# Patient Record
Sex: MALE | Race: WHITE | NOT HISPANIC OR LATINO | URBAN - METROPOLITAN AREA
[De-identification: names, ages, dates, MRNs, and addresses within clinical notes are randomized per-mention and may not be internally consistent; named-entity substitution may affect disease eponyms.]

---

## 2017-03-03 ENCOUNTER — OFFICE VISIT (OUTPATIENT)
Dept: INTERNAL MEDICINE | Facility: CLINIC | Age: 22
End: 2017-03-03

## 2017-03-03 VITALS
OXYGEN SATURATION: 100 % | HEIGHT: 76 IN | TEMPERATURE: 98 F | DIASTOLIC BLOOD PRESSURE: 70 MMHG | BODY MASS INDEX: 25.82 KG/M2 | HEART RATE: 59 BPM | SYSTOLIC BLOOD PRESSURE: 130 MMHG | WEIGHT: 212 LBS | RESPIRATION RATE: 12 BRPM

## 2017-03-03 DIAGNOSIS — L70.0 ACNE VULGARIS: ICD-10-CM

## 2017-03-03 DIAGNOSIS — Z78.9 VEGAN DIET: Primary | ICD-10-CM

## 2017-03-03 DIAGNOSIS — Z00.00 HEALTHCARE MAINTENANCE: ICD-10-CM

## 2017-03-03 PROCEDURE — 93000 ELECTROCARDIOGRAM COMPLETE: CPT | Performed by: INTERNAL MEDICINE

## 2017-03-03 PROCEDURE — 99385 PREV VISIT NEW AGE 18-39: CPT | Performed by: INTERNAL MEDICINE

## 2017-03-03 RX ORDER — LANOLIN ALCOHOL/MO/W.PET/CERES
CREAM (GRAM) TOPICAL
Qty: 60 TABLET | Refills: 5
Start: 2017-03-03

## 2017-03-03 RX ORDER — ERGOCALCIFEROL (VITAMIN D2) 10 MCG
400 TABLET ORAL DAILY
COMMUNITY
End: 2019-03-14

## 2017-03-03 NOTE — PROGRESS NOTES
"Annual Exam (new pt cpe)      HPI  Homer Tamez is a 21 y.o. male presents to establish/ CPE:  Transfer from Dr. Barrios in Peds.   Has been healthy overall.   Had surgery for varicocoele in 2009 due to pain and swelling. Sx resolved.   Had pilonidal cyst removed in 2014.  No recurrence.  Pt is vegan and takes Vitamin D and B12 daily.  Is active in the gym.       Review of Systems   Constitution: Negative for chills, fever, malaise/fatigue, weight gain and weight loss.   HENT: Negative for congestion, headaches, hearing loss, hoarse voice, odynophagia and sore throat.    Eyes: Negative for discharge, double vision, pain and redness.        No recent exam; no contacts or glasses   Cardiovascular: Negative for chest pain, dyspnea on exertion, irregular heartbeat, near-syncope, palpitations and syncope.   Respiratory: Negative for cough, shortness of breath and wheezing.    Skin: Negative for rash and suspicious lesions.   Musculoskeletal: Negative for joint pain, joint swelling, muscle cramps, muscle weakness and myalgias.   Gastrointestinal: Negative for bloating, abdominal pain, constipation, diarrhea, dysphagia, heartburn, nausea and vomiting.   Genitourinary: Negative for dysuria, frequency, hematuria, nocturia and urgency.   Neurological: Negative for dizziness and light-headedness.   Psychiatric/Behavioral: Negative for depression. The patient is nervous/anxious (\"recently, just with school\".  Not interfering with success or relationships. Does not feel like needs help with it. ). The patient does not have insomnia.        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[employer]; exercise science     Social History Main Topics   • Smoking status: Never Smoker   • Smokeless tobacco: Not on file   • Alcohol use No   • Drug use: Yes     Special: Marijuana      Comment: 1-2x/ week; smoke   • Sexual activity: Yes     Partners: Female     Birth control/ protection: Condom      Comment: one partner; no hx STD's     Other Topics Concern   • Not on file     Social History Narrative    Diet - Vegan since 2016     Exercise - 6x/ week weights >> cardio       Family History:   Family History   Problem Relation Age of Onset   • Asthma Mother    • Hyperlipidemia Father    • Hypertension Father    • Depression Father    • Anxiety disorder Father    • Sleep apnea Father    • Hernia Father    • Depression Brother    • Anxiety disorder Brother    • Hyperlipidemia Maternal Grandmother    • Hypertension Maternal Grandmother    • Stroke Maternal Grandmother    • Heart attack Maternal Grandmother    • Breast cancer Maternal Grandmother    • Asthma Maternal Grandmother    • Heart attack Maternal Grandfather      51   • Other Paternal Grandmother      brain tumor   • Hypertension Paternal Grandmother    • Sleep apnea Paternal Grandmother    • Drug abuse Paternal Grandmother    • Osteoporosis Paternal Grandmother    • Alcohol abuse Paternal Grandfather    • Hypertension Paternal Grandfather    • Liver cancer Paternal Grandfather    • Glaucoma Paternal Grandfather    • Heart attack Paternal Grandfather    • Other Paternal Grandfather      endarterectomy   • Leukemia Paternal Grandfather        Surgical History:   Past Surgical History   Procedure Laterality Date   • Varicocele excision Left 2009     due to pain and swelling, sx resolved post-op   • Pilonidal cystectomy  2014     no recurrence.          Current Outpatient Prescriptions:   •  Protein powder, , Disp: , Rfl:   •  Vitamin D, Cholecalciferol, (CHOLECALCIFEROL) 400 UNITS tablet,  Take 400 Units by mouth Daily., Disp: , Rfl:   •  vitamin B-12 (CYANOCOBALAMIN) 1000 MCG tablet, Take 2 PO daily, Disp: 60 tablet, Rfl: 5    Vitals:    03/03/17 1023   BP: 130/70   Pulse: 59   Resp: 12   Temp: 98 °F (36.7 °C)   SpO2: 100%     B/P recheck: 130/80    Physical Exam   Constitutional: He is oriented to person, place, and time. He appears well-developed and well-nourished. He is cooperative. No distress.   HENT:   Head: Normocephalic and atraumatic.   Right Ear: Hearing and external ear normal.   Left Ear: Hearing, tympanic membrane, external ear and ear canal normal.   Nose: Nose normal.   Mouth/Throat: Uvula is midline, oropharynx is clear and moist and mucous membranes are normal.   Partially impacted and obstructing cerumen in R canal, obscures TM.    Eyes: Conjunctivae, EOM and lids are normal. Pupils are equal, round, and reactive to light.   Neck: Normal range of motion and full passive range of motion without pain. Neck supple. Carotid bruit is not present. No thyroid mass and no thyromegaly present.   Cardiovascular: Normal rate, regular rhythm, S1 normal, S2 normal, normal heart sounds and intact distal pulses.  Exam reveals no gallop and no friction rub.    No murmur heard.  Pulses:       Radial pulses are 2+ on the right side, and 2+ on the left side.        Dorsalis pedis pulses are 2+ on the right side, and 2+ on the left side.        Posterior tibial pulses are 2+ on the right side, and 2+ on the left side.   Pulmonary/Chest: Effort normal and breath sounds normal. No respiratory distress. He has no wheezes. He has no rhonchi. He has no rales.   Abdominal: Soft. Bowel sounds are normal. He exhibits no distension and no mass. There is no hepatosplenomegaly. There is no tenderness. There is no rebound and no guarding. Hernia confirmed negative in the right inguinal area and confirmed negative in the left inguinal area.   Genitourinary: Testes normal and penis normal. Right testis shows no  mass. Left testis shows no mass. No discharge found.   Musculoskeletal: Normal range of motion. He exhibits no edema.        Cervical back: He exhibits normal range of motion and no deformity.        Thoracic back: He exhibits normal range of motion and no deformity.        Lumbar back: He exhibits normal range of motion and no deformity.       Vascular Status -  His exam exhibits right foot vasculature abnormal and no right foot edema. His exam exhibits left foot vasculature abnormal and no left foot edema.  Lymphadenopathy:     He has no cervical adenopathy.     He has no axillary adenopathy.        Right: No inguinal adenopathy present.        Left: No inguinal adenopathy present.   Neurological: He is alert and oriented to person, place, and time. He has normal strength and normal reflexes. He displays no tremor. No cranial nerve deficit or sensory deficit. He exhibits normal muscle tone. Gait normal.   Skin: Skin is warm, dry and intact. No rash noted.        Psychiatric: He has a normal mood and affect. His speech is normal and behavior is normal. Cognition and memory are normal.       ECG 12 Lead  Date/Time: 3/3/2017 12:08 PM  Performed by: BETHEL CAZARES  Authorized by: BETHEL CAZARES   Comparison: not compared with previous ECG   Rhythm: sinus rhythm  Rate: normal  BPM: 53  ST Segments: ST segments normal  T Waves: T waves normal  T depression: III  QRS axis: normal  Clinical impression: normal ECG  Comments: QTc - 390  Indication: New pt CPE, baseline            Assessment/ Plan  Diagnoses and all orders for this visit:    Vegan diet  -     Vitamin D 25 Hydroxy  -     Vitamin B12  -     Folate  -     vitamin B-12 (CYANOCOBALAMIN) 1000 MCG tablet; Take 2 PO daily    Acne vulgaris    Healthcare maintenance  -     CBC & Differential  -     Comprehensive Metabolic Panel  -     LDL Cholesterol, Direct  -     Cholesterol, Total  -     HDL Cholesterol  -     UA / M With / Rflx Culture([facility] ONLY)  -     TSH  -      Vitamin D 25 Hydroxy  -     Vitamin B12  -     Folate  -     C Trachomatis / N Gonorrhoeae PCR    Other orders  -     Discontinue: Cyanocobalamin (VITAMIN B 12 PO); Take  by mouth.  -     Vitamin D, Cholecalciferol, (CHOLECALCIFEROL) 400 UNITS tablet; Take 400 Units by mouth Daily.  -     Protein powder;         Return in about 1 year (around 3/3/2018) for Annual physical.      Discussion:  Homer Tamez is a 21 y.o. male presents as new pt for CPE today, transfer from Peds. Pt is very health conscious with good workout regimen and now transitioned to vegan diet.   1. Acne - primarily closed comedones on forehead and back.  Declines tx today as getting better with age.  Advised trial of OTC benzoyl peroxide wash daily. RTC if progresses as will need oral Abx course to gain full control given diffuse back acne issue.    2. Vegan diet - for last year. Advise increase B12 to 2000 mcg daily and c/w Vitamin D daily. Will check levels today.    3. HM - check labs; Flu/ Tdap/ Gardasil/ Hep B/ Menactra - check Peds records and pt to obtain; c/w condom use; c/w exercise.   --> Pre-HTN numbers in office today - not c/w OPT readings typically in 110's. I suspect some anxiety element.  I have asked pt to monitor pressures at home/ drugstore/ gym and call if numbers progress or are > 140/90.  I did advise pt to add more cardio to workout routine that is primarily weights at this time.      RTC one year CPE.

## 2017-03-07 LAB
25(OH)D3+25(OH)D2 SERPL-MCNC: 31.1 NG/ML (ref 30–100)
ALBUMIN SERPL-MCNC: 5 G/DL (ref 3.5–5.2)
ALBUMIN/GLOB SERPL: 1.8 G/DL
ALP SERPL-CCNC: 86 U/L (ref 39–117)
ALT SERPL-CCNC: 19 U/L (ref 1–41)
APPEARANCE UR: CLEAR
AST SERPL-CCNC: 20 U/L (ref 1–40)
BACTERIA #/AREA URNS HPF: ABNORMAL /HPF
BASOPHILS # BLD AUTO: 0.02 10*3/MM3 (ref 0–0.2)
BASOPHILS NFR BLD AUTO: 0.4 % (ref 0–1.5)
BILIRUB SERPL-MCNC: 0.4 MG/DL (ref 0.1–1.2)
BILIRUB UR QL STRIP: NEGATIVE
BUN SERPL-MCNC: 11 MG/DL (ref 6–20)
BUN/CREAT SERPL: 11 (ref 7–25)
C TRACH RRNA SPEC QL NAA+PROBE: NEGATIVE
CALCIUM SERPL-MCNC: 9.9 MG/DL (ref 8.6–10.5)
CHLORIDE SERPL-SCNC: 100 MMOL/L (ref 98–107)
CHOLEST SERPL-MCNC: 106 MG/DL (ref 0–200)
CO2 SERPL-SCNC: 29.6 MMOL/L (ref 22–29)
COLOR UR: YELLOW
CREAT SERPL-MCNC: 1 MG/DL (ref 0.76–1.27)
CRYSTALS URNS MICRO: ABNORMAL
EOSINOPHIL # BLD AUTO: 0.16 10*3/MM3 (ref 0–0.7)
EOSINOPHIL NFR BLD AUTO: 3.2 % (ref 0.3–6.2)
EPI CELLS #/AREA URNS HPF: ABNORMAL /HPF
ERYTHROCYTE [DISTWIDTH] IN BLOOD BY AUTOMATED COUNT: 12.6 % (ref 11.5–14.5)
FOLATE SERPL-MCNC: 6.71 NG/ML (ref 4.78–24.2)
GLOBULIN SER CALC-MCNC: 2.8 GM/DL
GLUCOSE SERPL-MCNC: 91 MG/DL (ref 65–99)
GLUCOSE UR QL: NEGATIVE
HCT VFR BLD AUTO: 46.9 % (ref 40.4–52.2)
HDLC SERPL-MCNC: 27 MG/DL (ref 40–60)
HGB BLD-MCNC: 15.5 G/DL (ref 13.7–17.6)
HGB UR QL STRIP: NEGATIVE
IMM GRANULOCYTES # BLD: 0 10*3/MM3 (ref 0–0.03)
IMM GRANULOCYTES NFR BLD: 0 % (ref 0–0.5)
KETONES UR QL STRIP: NEGATIVE
LDLC SERPL DIRECT ASSAY-MCNC: 62 MG/DL (ref 0–100)
LEUKOCYTE ESTERASE UR QL STRIP: NEGATIVE
LYMPHOCYTES # BLD AUTO: 1.07 10*3/MM3 (ref 0.9–4.8)
LYMPHOCYTES NFR BLD AUTO: 21.2 % (ref 19.6–45.3)
MCH RBC QN AUTO: 32.6 PG (ref 27–32.7)
MCHC RBC AUTO-ENTMCNC: 33 G/DL (ref 32.6–36.4)
MCV RBC AUTO: 98.5 FL (ref 79.8–96.2)
MICRO URNS: ABNORMAL
MICRO URNS: ABNORMAL
MONOCYTES # BLD AUTO: 0.38 10*3/MM3 (ref 0.2–1.2)
MONOCYTES NFR BLD AUTO: 7.5 % (ref 5–12)
MUCOUS THREADS URNS QL MICRO: PRESENT /HPF
N GONORRHOEA RRNA SPEC QL NAA+PROBE: NEGATIVE
NEUTROPHILS # BLD AUTO: 3.41 10*3/MM3 (ref 1.9–8.1)
NEUTROPHILS NFR BLD AUTO: 67.7 % (ref 42.7–76)
NITRITE UR QL STRIP: NEGATIVE
PH UR STRIP: 7 [PH] (ref 5–7.5)
PLATELET # BLD AUTO: 219 10*3/MM3 (ref 140–500)
POTASSIUM SERPL-SCNC: 4.2 MMOL/L (ref 3.5–5.2)
PROT SERPL-MCNC: 7.8 G/DL (ref 6–8.5)
PROT UR QL STRIP: NEGATIVE
RBC # BLD AUTO: 4.76 10*6/MM3 (ref 4.6–6)
RBC #/AREA URNS HPF: ABNORMAL /HPF
SODIUM SERPL-SCNC: 143 MMOL/L (ref 136–145)
SP GR UR: <=1.005 (ref 1–1.03)
TSH SERPL DL<=0.005 MIU/L-ACNC: 3.33 MIU/ML (ref 0.27–4.2)
UNIDENT CRYS URNS QL MICRO: PRESENT /LPF
URINALYSIS REFLEX: ABNORMAL
UROBILINOGEN UR STRIP-MCNC: 0.2 MG/DL (ref 0.2–1)
VIT B12 SERPL-MCNC: 1009 PG/ML (ref 211–946)
WBC # BLD AUTO: 5.04 10*3/MM3 (ref 4.5–10.7)
WBC #/AREA URNS HPF: ABNORMAL /HPF

## 2018-03-08 ENCOUNTER — OFFICE VISIT (OUTPATIENT)
Dept: INTERNAL MEDICINE | Facility: CLINIC | Age: 23
End: 2018-03-08

## 2018-03-08 VITALS
OXYGEN SATURATION: 100 % | RESPIRATION RATE: 12 BRPM | SYSTOLIC BLOOD PRESSURE: 122 MMHG | HEIGHT: 76 IN | WEIGHT: 211 LBS | TEMPERATURE: 98.6 F | DIASTOLIC BLOOD PRESSURE: 70 MMHG | HEART RATE: 54 BPM | BODY MASS INDEX: 25.69 KG/M2

## 2018-03-08 DIAGNOSIS — S86.012D STRAIN OF LEFT ACHILLES TENDON, SUBSEQUENT ENCOUNTER: ICD-10-CM

## 2018-03-08 DIAGNOSIS — F41.8 SITUATIONAL ANXIETY: ICD-10-CM

## 2018-03-08 DIAGNOSIS — Z78.9 VEGAN DIET: ICD-10-CM

## 2018-03-08 DIAGNOSIS — H61.23 HEARING LOSS DUE TO CERUMEN IMPACTION, BILATERAL: ICD-10-CM

## 2018-03-08 DIAGNOSIS — R68.82 LOSS OF LIBIDO: ICD-10-CM

## 2018-03-08 DIAGNOSIS — Z00.00 HEALTHCARE MAINTENANCE: Primary | ICD-10-CM

## 2018-03-08 DIAGNOSIS — L70.0 ACNE VULGARIS: ICD-10-CM

## 2018-03-08 PROBLEM — S86.019A STRAIN OF ACHILLES TENDON: Status: ACTIVE | Noted: 2017-07-13

## 2018-03-08 PROCEDURE — 99395 PREV VISIT EST AGE 18-39: CPT | Performed by: INTERNAL MEDICINE

## 2018-03-08 PROCEDURE — 99212 OFFICE O/P EST SF 10 MIN: CPT | Performed by: INTERNAL MEDICINE

## 2018-03-08 NOTE — PROGRESS NOTES
"Annual Exam (review of medical issues )      HPI  Homer Tamez is a 22 y.o. male RTC In yearly CPE, review of medical issues:  Has been doing well. Sprained L ankle playing basketball and has been seeing ortho.  Getting better. Doing all PT stuff.   1. Acne, primarily closed comedones on forehead and back \"it is about the same\".Did not use anything for it over year.  Notes it gets better in summer.     Declines tx today as getting better with age.  Advised trial of OTC benzoyl peroxide wash daily. RTC if progresses as will need oral Abx course to gain full control given diffuse back acne issue.    2. Vegan diet - for last 2 years. Still on B12 to 2000 mcg daily, Vitamin D daily. Added algae oil daily.   Notes \"I feel lighter\".   3. Anxiety - notes that stress has increased over the year.  School and recent break-up. Notes has had a few events of \"panic attacks recently\".  Events start with \"loose sense of reality and has some trouble forming words\".  Events are often at night when wakes up.  Started seeing therapist over year and \"that is what he called them\".  Admits does feel anxious during these events. Events last '45 minutes at the longest to 60 seconds at the shortest'.  Cannot trace sx onset to any one event/ trigger. Has stopped seeing therapist.  Feels like getting over break-up.  Thinks right now is doing OK, but \"is affecting my sex drive. I have had a hard time getting it up and keeping it up\".  Has some variability with interest that varies day to day.  Was not going on when seeing therapist.  ED issue is only with other people, not during masturbation. Nothing has really made better or worse.       Review of Systems   Constitution: Negative for chills, fever, malaise/fatigue, weight gain and weight loss.   HENT: Positive for hearing loss (mild, thinks is due to wax). Negative for congestion, odynophagia and sore throat.    Eyes: Negative for discharge, double vision, pain and redness.        Last " "eye exam unknown, no glasses     Cardiovascular: Negative for chest pain, dyspnea on exertion, irregular heartbeat, leg swelling, near-syncope, palpitations and syncope.   Respiratory: Negative for cough.    Hematologic/Lymphatic: Negative for bleeding problem. Does not bruise/bleed easily.   Skin: Negative for rash and suspicious lesions.   Musculoskeletal: Positive for joint pain (L heel with Achilles tendonitis). Negative for joint swelling, muscle cramps and muscle weakness.   Gastrointestinal: Negative for constipation, diarrhea, dysphagia, heartburn, nausea and vomiting.   Genitourinary: Negative for dysuria, frequency, genital sores, hematuria, hesitancy and incomplete emptying.   Neurological: Negative for dizziness, headaches and light-headedness.   Psychiatric/Behavioral: Negative for depression (\"I am getting over it\"') and suicidal ideas. The patient is nervous/anxious. The patient does not have insomnia.        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[employer]; exercise science     Social History Main Topics   • Smoking status: Never Smoker   • Smokeless tobacco: Never Used   • Alcohol use Yes      Comment: beer once weekly   • Drug use: Yes     Special: Marijuana      Comment: 1-2x/ week; smoke; quit in summer 2017   • Sexual activity: Yes     Partners: Female     Birth control/ protection: Condom      Comment: one partner; no hx STD's     Other Topics Concern   • Not on file     Social History " Narrative    Diet - Vegan since 2016     Exercise - 6x/ week weights >> cardio    Caffeine - Coffee daily, 2 cups       Family History:   Family History   Problem Relation Age of Onset   • Asthma Mother    • Hyperlipidemia Father    • Hypertension Father    • Depression Father    • Anxiety disorder Father    • Sleep apnea Father    • Hernia Father    • Depression Brother    • Anxiety disorder Brother    • Hyperlipidemia Maternal Grandmother    • Hypertension Maternal Grandmother    • Stroke Maternal Grandmother    • Heart attack Maternal Grandmother    • Breast cancer Maternal Grandmother    • Asthma Maternal Grandmother    • Heart attack Maternal Grandfather      51   • Other Paternal Grandmother      brain tumor   • Hypertension Paternal Grandmother    • Sleep apnea Paternal Grandmother    • Drug abuse Paternal Grandmother    • Osteoporosis Paternal Grandmother    • Alcohol abuse Paternal Grandfather    • Hypertension Paternal Grandfather    • Liver cancer Paternal Grandfather    • Glaucoma Paternal Grandfather    • Heart attack Paternal Grandfather    • Other Paternal Grandfather      endarterectomy   • Leukemia Paternal Grandfather        Surgical History:   Past Surgical History:   Procedure Laterality Date   • PILONIDAL CYSTECTOMY  2014    no recurrence.    • VARICOCELE EXCISION Left 2009    due to pain and swelling, sx resolved post-op         Current Outpatient Prescriptions:   •  Protein powder, , Disp: , Rfl:   •  vitamin B-12 (CYANOCOBALAMIN) 1000 MCG tablet, Take 2 PO daily, Disp: 60 tablet, Rfl: 5  •  Vitamin D, Cholecalciferol, (CHOLECALCIFEROL) 400 UNITS tablet, Take 400 Units by mouth Daily., Disp: , Rfl:     Vitals:    03/08/18 1258   BP: 122/70   Pulse: 54   Resp: 12   Temp: 98.6 °F (37 °C)   SpO2: 100%       Physical Exam   Constitutional: He is oriented to person, place, and time. He appears well-developed and well-nourished. He is cooperative. No distress.   Muscular habitus     HENT:   Head:  Normocephalic and atraumatic.   Right Ear: Hearing, tympanic membrane, external ear and ear canal normal.   Left Ear: Hearing, tympanic membrane, external ear and ear canal normal.   Nose: Nose normal.   Mouth/Throat: Uvula is midline, oropharynx is clear and moist and mucous membranes are normal. No oropharyngeal exudate.   Eyes: Conjunctivae, EOM and lids are normal. Pupils are equal, round, and reactive to light. Right eye exhibits no discharge. Left eye exhibits no discharge.   Neck: Normal range of motion and full passive range of motion without pain. Neck supple. Carotid bruit is not present. No thyroid mass and no thyromegaly present.   Cardiovascular: Normal rate, regular rhythm, S1 normal, S2 normal, normal heart sounds and intact distal pulses.  Exam reveals no gallop and no friction rub.    No murmur heard.  Pulses:       Radial pulses are 2+ on the right side, and 2+ on the left side.        Dorsalis pedis pulses are 2+ on the right side, and 2+ on the left side.        Posterior tibial pulses are 2+ on the right side, and 2+ on the left side.   Pulmonary/Chest: Effort normal and breath sounds normal. No respiratory distress. He has no wheezes. He has no rhonchi. He has no rales.   Abdominal: Soft. Bowel sounds are normal. He exhibits no distension and no mass. There is no hepatosplenomegaly. There is no tenderness. There is no rebound and no guarding. Hernia confirmed negative in the right inguinal area and confirmed negative in the left inguinal area.   Genitourinary: Testes normal and penis normal. Right testis shows no mass. Left testis shows no mass. No discharge found.   Musculoskeletal: Normal range of motion. He exhibits no edema.       Vascular Status -  His exam exhibits right foot vasculature normal. His exam exhibits no right foot edema. His exam exhibits left foot vasculature normal. His exam exhibits no left foot edema.   Skin Integrity  -  His right foot skin is intact.     Homer 's left  foot skin is intact. .  Lymphadenopathy:     He has no cervical adenopathy.     He has no axillary adenopathy.        Right: No inguinal adenopathy present.        Left: No inguinal adenopathy present.   Neurological: He is alert and oriented to person, place, and time. He has normal strength and normal reflexes. He displays no tremor. No cranial nerve deficit or sensory deficit. He exhibits normal muscle tone. Gait normal.   Skin: Skin is warm, dry and intact. No rash noted.        Normal, diffuse body hair   Psychiatric: He has a normal mood and affect. His speech is normal and behavior is normal. Thought content normal. His mood appears not anxious. His affect is not angry, not labile and not inappropriate. Cognition and memory are normal. He does not exhibit a depressed mood.   Vitals reviewed.      Assessment/ Plan  Diagnoses and all orders for this visit:    Healthcare maintenance  -     CBC & Differential  -     Comprehensive Metabolic Panel  -     HDL Cholesterol  -     LDL Cholesterol, Direct  -     Cholesterol, Total  -     Methylmalonic Acid, Serum  -     Vitamin B12  -     Folate  -     TSH  -     UA / M With / Rflx Culture([facility] ONLY) - Urine, Clean Catch  -     Vitamin D 25 Hydroxy    Strain of left Achilles tendon, subsequent encounter    Acne vulgaris    Vegan diet  -     Methylmalonic Acid, Serum  -     Vitamin B12  -     Folate  -     Vitamin D 25 Hydroxy    Situational anxiety    Loss of libido  -     Methylmalonic Acid, Serum  -     Vitamin B12  -     Folate  -     TSH    Hearing loss due to cerumen impaction, bilateral        Return in about 1 year (around 3/8/2019) for Annual physical.      Discussion:  Homer Tamez is a 22 y.o. male RTC In yearly CPE, review of medical issues:   1. L ankle Achilles tendonitis - recent issue after ankle sprain in basketball. Managed with orhto, improving.   2. Acne, primarily closed comedones on forehead and back - stable, not bothersome to pt.  Declines  OTC benzoyl peroxide wash at this time.   3. Vegan diet - for last ~ 2 years. Remains on B12 daily and Vitamin D daily.  Will trend B12 levels today on labs.    4. Anxiety, recurrent, with recent onset of panic events at night - pt had some issues over the year and did see therapist, Claude Druitt.  Improved mood after started getting over break-up with girlfriend, but sx have recurred with school stress and now having some panic as well as noted ED/ loss of libido.  We discussed anxiety issues (moderate GAD7 scores today and Adjustment d/o and I suggested re-engaging in talk therapy.  Discussed SSRI side effects including sexual side effects and my concern that side effects of those meds may adversely affect his number one side effect concern.  Pt agrees and will readdress issues in talk therapy. If feels med addition needed will RTC.   CHeck TSH and B12 levels today.   4. Hearing loss due cerumen impaction B - full removed by pick on L , partial removal on R only.  Recommend hydrogen peroxide drops priro to shower a few times weekly.   5. ED/ loss of libido - suspect related to mood. Exam in benign and pt with normal body hair and muscle mass that does not suggest low T at all. Will check TSH and B12 today.   6. HM - check labs; Flu - next season; Tdap/ Hep B/ Menactra -UTD; Gardasil discuss at f/u; c/w condom use; U G/C screen today (new sexual partner); c/w exercise.     RTC one year CPE, F labs prior

## 2018-03-12 LAB
C TRACH RRNA SPEC QL NAA+PROBE: NEGATIVE
N GONORRHOEA RRNA SPEC QL NAA+PROBE: NEGATIVE

## 2018-03-13 LAB
25(OH)D3+25(OH)D2 SERPL-MCNC: 33.4 NG/ML (ref 30–100)
ALBUMIN SERPL-MCNC: 5.2 G/DL (ref 3.5–5.2)
ALBUMIN/GLOB SERPL: 2.1 G/DL
ALP SERPL-CCNC: 81 U/L (ref 39–117)
ALT SERPL-CCNC: 16 U/L (ref 1–41)
APPEARANCE UR: CLEAR
AST SERPL-CCNC: 19 U/L (ref 1–40)
BACTERIA #/AREA URNS HPF: NORMAL /HPF
BASOPHILS # BLD AUTO: 0.02 10*3/MM3 (ref 0–0.2)
BASOPHILS NFR BLD AUTO: 0.4 % (ref 0–1.5)
BILIRUB SERPL-MCNC: 0.4 MG/DL (ref 0.1–1.2)
BILIRUB UR QL STRIP: NEGATIVE
BUN SERPL-MCNC: 11 MG/DL (ref 6–20)
BUN/CREAT SERPL: 12 (ref 7–25)
CALCIUM SERPL-MCNC: 9.6 MG/DL (ref 8.6–10.5)
CHLORIDE SERPL-SCNC: 102 MMOL/L (ref 98–107)
CHOLEST SERPL-MCNC: 107 MG/DL (ref 0–200)
CO2 SERPL-SCNC: 28.2 MMOL/L (ref 22–29)
COLOR UR: YELLOW
CREAT SERPL-MCNC: 0.92 MG/DL (ref 0.76–1.27)
EOSINOPHIL # BLD AUTO: 0.25 10*3/MM3 (ref 0–0.7)
EOSINOPHIL NFR BLD AUTO: 4.8 % (ref 0.3–6.2)
EPI CELLS #/AREA URNS HPF: NORMAL /HPF
ERYTHROCYTE [DISTWIDTH] IN BLOOD BY AUTOMATED COUNT: 12.3 % (ref 11.5–14.5)
FOLATE SERPL-MCNC: 5.79 NG/ML (ref 4.78–24.2)
GFR SERPLBLD CREATININE-BSD FMLA CKD-EPI: 104 ML/MIN/1.73
GFR SERPLBLD CREATININE-BSD FMLA CKD-EPI: 126 ML/MIN/1.73
GLOBULIN SER CALC-MCNC: 2.5 GM/DL
GLUCOSE SERPL-MCNC: 67 MG/DL (ref 65–99)
GLUCOSE UR QL: NEGATIVE
HCT VFR BLD AUTO: 46.4 % (ref 40.4–52.2)
HDLC SERPL-MCNC: 26 MG/DL (ref 40–60)
HGB BLD-MCNC: 15.1 G/DL (ref 13.7–17.6)
HGB UR QL STRIP: NEGATIVE
IMM GRANULOCYTES # BLD: 0 10*3/MM3 (ref 0–0.03)
IMM GRANULOCYTES NFR BLD: 0 % (ref 0–0.5)
KETONES UR QL STRIP: NEGATIVE
LDLC SERPL DIRECT ASSAY-MCNC: 64 MG/DL (ref 0–100)
LEUKOCYTE ESTERASE UR QL STRIP: NEGATIVE
LYMPHOCYTES # BLD AUTO: 1.37 10*3/MM3 (ref 0.9–4.8)
LYMPHOCYTES NFR BLD AUTO: 26.4 % (ref 19.6–45.3)
MCH RBC QN AUTO: 32.3 PG (ref 27–32.7)
MCHC RBC AUTO-ENTMCNC: 32.5 G/DL (ref 32.6–36.4)
MCV RBC AUTO: 99.1 FL (ref 79.8–96.2)
METHYLMALONATE SERPL-SCNC: 84 NMOL/L (ref 0–378)
MICRO URNS: NORMAL
MICRO URNS: NORMAL
MONOCYTES # BLD AUTO: 0.28 10*3/MM3 (ref 0.2–1.2)
MONOCYTES NFR BLD AUTO: 5.4 % (ref 5–12)
NEUTROPHILS # BLD AUTO: 3.26 10*3/MM3 (ref 1.9–8.1)
NEUTROPHILS NFR BLD AUTO: 63 % (ref 42.7–76)
NITRITE UR QL STRIP: NEGATIVE
PH UR STRIP: 6.5 [PH] (ref 5–7.5)
PLATELET # BLD AUTO: 203 10*3/MM3 (ref 140–500)
POTASSIUM SERPL-SCNC: 4.3 MMOL/L (ref 3.5–5.2)
PROT SERPL-MCNC: 7.7 G/DL (ref 6–8.5)
PROT UR QL STRIP: NEGATIVE
RBC # BLD AUTO: 4.68 10*6/MM3 (ref 4.6–6)
RBC #/AREA URNS HPF: NORMAL /HPF
SODIUM SERPL-SCNC: 145 MMOL/L (ref 136–145)
SP GR UR: 1.01 (ref 1–1.03)
TSH SERPL DL<=0.005 MIU/L-ACNC: 3.23 MIU/ML (ref 0.27–4.2)
URINALYSIS REFLEX: NORMAL
UROBILINOGEN UR STRIP-MCNC: 0.2 MG/DL (ref 0.2–1)
VIT B12 SERPL-MCNC: 809 PG/ML (ref 211–946)
WBC # BLD AUTO: 5.18 10*3/MM3 (ref 4.5–10.7)
WBC #/AREA URNS HPF: NORMAL /HPF

## 2019-02-28 DIAGNOSIS — Z20.828 EXPOSURE TO INFLUENZA: Primary | ICD-10-CM

## 2019-02-28 RX ORDER — OSELTAMIVIR PHOSPHATE 75 MG/1
75 CAPSULE ORAL DAILY
Qty: 10 CAPSULE | Refills: 0 | Status: SHIPPED | OUTPATIENT
Start: 2019-02-28 | End: 2019-03-10

## 2019-03-04 DIAGNOSIS — Z00.00 HEALTHCARE MAINTENANCE: Primary | ICD-10-CM

## 2019-03-13 LAB
25(OH)D3+25(OH)D2 SERPL-MCNC: 36.2 NG/ML (ref 30–100)
ALBUMIN SERPL-MCNC: 4.6 G/DL (ref 3.5–5.2)
ALBUMIN/GLOB SERPL: 1.9 G/DL
ALP SERPL-CCNC: 73 U/L (ref 39–117)
ALT SERPL-CCNC: 33 U/L (ref 1–41)
APPEARANCE UR: CLEAR
AST SERPL-CCNC: 29 U/L (ref 1–40)
BACTERIA #/AREA URNS HPF: NORMAL /HPF
BASOPHILS # BLD AUTO: 0.03 10*3/MM3 (ref 0–0.2)
BASOPHILS NFR BLD AUTO: 0.7 % (ref 0–1.5)
BILIRUB SERPL-MCNC: 0.4 MG/DL (ref 0.1–1.2)
BILIRUB UR QL STRIP: NEGATIVE
BUN SERPL-MCNC: 14 MG/DL (ref 6–20)
BUN/CREAT SERPL: 12 (ref 7–25)
C TRACH RRNA SPEC QL NAA+PROBE: NEGATIVE
CALCIUM SERPL-MCNC: 9.4 MG/DL (ref 8.6–10.5)
CHLORIDE SERPL-SCNC: 102 MMOL/L (ref 98–107)
CHOLEST SERPL-MCNC: 105 MG/DL (ref 0–200)
CO2 SERPL-SCNC: 28.9 MMOL/L (ref 22–29)
COLOR UR: YELLOW
CREAT SERPL-MCNC: 1.17 MG/DL (ref 0.76–1.27)
EOSINOPHIL # BLD AUTO: 0.12 10*3/MM3 (ref 0–0.4)
EOSINOPHIL NFR BLD AUTO: 3 % (ref 0.3–6.2)
EPI CELLS #/AREA URNS HPF: NORMAL /HPF
ERYTHROCYTE [DISTWIDTH] IN BLOOD BY AUTOMATED COUNT: 12.1 % (ref 12.3–15.4)
GLOBULIN SER CALC-MCNC: 2.4 GM/DL
GLUCOSE SERPL-MCNC: 72 MG/DL (ref 65–99)
GLUCOSE UR QL: NEGATIVE
HCT VFR BLD AUTO: 47.2 % (ref 37.5–51)
HGB BLD-MCNC: 15.2 G/DL (ref 13–17.7)
HGB UR QL STRIP: NEGATIVE
IMM GRANULOCYTES # BLD AUTO: 0.01 10*3/MM3 (ref 0–0.05)
IMM GRANULOCYTES NFR BLD AUTO: 0.2 % (ref 0–0.5)
KETONES UR QL STRIP: NEGATIVE
LDLC SERPL DIRECT ASSAY-MCNC: 68 MG/DL (ref 0–100)
LEUKOCYTE ESTERASE UR QL STRIP: NEGATIVE
LYMPHOCYTES # BLD AUTO: 1.23 10*3/MM3 (ref 0.7–3.1)
LYMPHOCYTES NFR BLD AUTO: 30.3 % (ref 19.6–45.3)
MCH RBC QN AUTO: 31.6 PG (ref 26.6–33)
MCHC RBC AUTO-ENTMCNC: 32.2 G/DL (ref 31.5–35.7)
MCV RBC AUTO: 98.1 FL (ref 79–97)
MICRO URNS: NORMAL
MICRO URNS: NORMAL
MONOCYTES # BLD AUTO: 0.41 10*3/MM3 (ref 0.1–0.9)
MONOCYTES NFR BLD AUTO: 10.1 % (ref 5–12)
N GONORRHOEA RRNA SPEC QL NAA+PROBE: NEGATIVE
NEUTROPHILS # BLD AUTO: 2.26 10*3/MM3 (ref 1.4–7)
NEUTROPHILS NFR BLD AUTO: 55.7 % (ref 42.7–76)
NITRITE UR QL STRIP: NEGATIVE
NRBC BLD AUTO-RTO: 0 /100 WBC (ref 0–0)
PH UR STRIP: 7 [PH] (ref 5–7.5)
PLATELET # BLD AUTO: 224 10*3/MM3 (ref 140–450)
POTASSIUM SERPL-SCNC: 4.7 MMOL/L (ref 3.5–5.2)
PROT SERPL-MCNC: 7 G/DL (ref 6–8.5)
PROT UR QL STRIP: NEGATIVE
RBC # BLD AUTO: 4.81 10*6/MM3 (ref 4.14–5.8)
RBC #/AREA URNS HPF: NORMAL /HPF
SODIUM SERPL-SCNC: 142 MMOL/L (ref 136–145)
SP GR UR: 1.01 (ref 1–1.03)
TSH SERPL DL<=0.005 MIU/L-ACNC: 2.78 MIU/ML (ref 0.27–4.2)
URINALYSIS REFLEX: NORMAL
UROBILINOGEN UR STRIP-MCNC: 0.2 MG/DL (ref 0.2–1)
WBC # BLD AUTO: 4.06 10*3/MM3 (ref 3.4–10.8)
WBC #/AREA URNS HPF: NORMAL /HPF

## 2019-03-14 ENCOUNTER — OFFICE VISIT (OUTPATIENT)
Dept: INTERNAL MEDICINE | Facility: CLINIC | Age: 24
End: 2019-03-14

## 2019-03-14 VITALS
HEART RATE: 83 BPM | HEIGHT: 76 IN | BODY MASS INDEX: 27.89 KG/M2 | WEIGHT: 229 LBS | RESPIRATION RATE: 12 BRPM | DIASTOLIC BLOOD PRESSURE: 70 MMHG | SYSTOLIC BLOOD PRESSURE: 124 MMHG | TEMPERATURE: 99 F | OXYGEN SATURATION: 99 %

## 2019-03-14 DIAGNOSIS — Z00.00 HEALTHCARE MAINTENANCE: ICD-10-CM

## 2019-03-14 DIAGNOSIS — Z78.9 VEGAN DIET: ICD-10-CM

## 2019-03-14 DIAGNOSIS — S86.012D STRAIN OF LEFT ACHILLES TENDON, SUBSEQUENT ENCOUNTER: ICD-10-CM

## 2019-03-14 DIAGNOSIS — K21.9 GASTROESOPHAGEAL REFLUX DISEASE, ESOPHAGITIS PRESENCE NOT SPECIFIED: ICD-10-CM

## 2019-03-14 DIAGNOSIS — L70.0 ACNE VULGARIS: Primary | ICD-10-CM

## 2019-03-14 PROCEDURE — 90715 TDAP VACCINE 7 YRS/> IM: CPT | Performed by: INTERNAL MEDICINE

## 2019-03-14 PROCEDURE — 90471 IMMUNIZATION ADMIN: CPT | Performed by: INTERNAL MEDICINE

## 2019-03-14 PROCEDURE — 99395 PREV VISIT EST AGE 18-39: CPT | Performed by: INTERNAL MEDICINE

## 2019-03-14 RX ORDER — MELATONIN
1000 DAILY
COMMUNITY

## 2019-03-14 RX ORDER — OMEPRAZOLE 20 MG/1
20 CAPSULE, DELAYED RELEASE ORAL DAILY
Qty: 30 CAPSULE | Refills: 0 | Status: SHIPPED | OUTPATIENT
Start: 2019-03-14 | End: 2019-04-13 | Stop reason: SDUPTHER

## 2019-03-14 NOTE — PROGRESS NOTES
"Annual Exam (review of medical issues )      HPI  Homer Tamez is a 23 y.o. male RTC in yearly CPE, review of medical issues:   Has been doing well. 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  Health has been good.  Had URI in 2/2018 and given Abx.   1. L ankle Achilles tendonitis - resolved last year after ortho eval and tx.  Is limited a bit with running. Is resting from running right now.  recent issue after ankle sprain in basketball. Managed with orhto, improving.   2. Acne, primarily closed comedones on forehead and back - stable, not bothersome to pt.  No issues. Sx on back are better in summer when gets more sun.     3. Vegan diet - for last ~ 2 years. Remains on B12 daily and Vitamin D3 daily.  Will trend B12 levels today on labs.    4. Anxiety, recurrent, with recent onset of panic events at night - has been doing well.  No longer in talk therapy.  No panic attacks. Sleeping well.   4. Hearing loss due cerumen impaction B - full removed by pick on L , partial removal on R only.  Recommend hydrogen peroxide drops prior to shower a few times weekly.   5. ED/ loss of libido - \"I think that has improved'. No active currently.        Review of Systems   Constitution: Negative for chills, fever, malaise/fatigue, weight gain and weight loss.   HENT: Negative for congestion, hearing loss, odynophagia and sore throat.    Eyes: Negative for discharge, double vision, pain and redness.        Last eye exam unknown, no glasses     Cardiovascular: Negative for chest pain, dyspnea on exertion, irregular heartbeat, near-syncope, palpitations and syncope.   Respiratory: Positive for sputum production (small mucous in throat for months, can clear easily; worse with coffee). Negative for cough, shortness of breath and wheezing.    Endocrine: Negative for polydipsia, polyphagia and polyuria.   Hematologic/Lymphatic: Negative for bleeding problem. Does not bruise/bleed easily.   Skin: Negative for rash and " suspicious lesions.   Musculoskeletal: Negative for arthritis, joint pain, joint swelling, muscle cramps, muscle weakness and myalgias.   Gastrointestinal: Negative for bloating, abdominal pain, constipation, diarrhea, dysphagia, heartburn, nausea and vomiting.   Genitourinary: Negative for dysuria, frequency and hematuria.   Neurological: Negative for dizziness, headaches and light-headedness.   Psychiatric/Behavioral: Negative for depression. The patient does not have insomnia and is not nervous/anxious.    Allergic/Immunologic: Negative for environmental allergies and persistent infections.       Problem List:    Patient Active Problem List   Diagnosis   • Vegan diet   • Acne vulgaris   • Strain of Achilles tendon       Medical History:    Past Medical History:   Diagnosis Date   • Acne vulgaris 3/3/2017    Face and back; primarily closed comedones    • Left varicocele     s/p surgical correction in 2009   • Pilonidal cyst    • Vegan diet 3/3/2017        Social History:    Social History     Socioeconomic History   • Marital status: Single     Spouse name: Not on file   • Number of children: 0   • Years of education: Not on file   • Highest education level: Not on file   Social Needs   • Financial resource strain: Not on file   • Food insecurity - worry: Not on file   • Food insecurity - inability: Not on file   • Transportation needs - medical: Not on file   • Transportation needs - non-medical: Not on file   Occupational History   • 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[employer]; exercise science   • Occupation:      Comment: [employer]   Tobacco Use   • Smoking status: Never Smoker   • Smokeless tobacco: Never Used   Substance and Sexual Activity   • Alcohol use: Yes     Comment: beer once weekly   • Drug use: No     Comment: 1-2x/ week; smoke; quit in summer 2017   • Sexual activity: No     Partners: Female     Birth control/protection: Condom     Comment: one partner; no hx STD's; not active in 2019    Other Topics Concern   • Not on file   Social History Narrative    Diet - Vegan since 2016     Exercise - 6x/ week weights >> cardio    Caffeine - Coffee daily, 2 cups       Family History:   Family History   Problem Relation Age of Onset   • Asthma Mother    • Hyperlipidemia Father    • Hypertension Father    • Depression Father    • Anxiety disorder Father    • Sleep apnea Father    • Hernia Father    • Depression Brother    • Anxiety disorder Brother    • Hyperlipidemia Maternal Grandmother    • Hypertension Maternal Grandmother    • Stroke Maternal Grandmother    • Heart attack Maternal Grandmother    • Breast cancer Maternal Grandmother    • Asthma Maternal Grandmother    • Heart attack Maternal Grandfather         51   • Other Paternal Grandmother         brain tumor   • Hypertension Paternal Grandmother    • Sleep apnea Paternal Grandmother    • Drug abuse Paternal Grandmother    • Osteoporosis Paternal Grandmother    • Alcohol abuse Paternal Grandfather    • Hypertension Paternal Grandfather    • Liver cancer Paternal Grandfather    • Glaucoma Paternal Grandfather    • Heart attack Paternal Grandfather    • Other Paternal Grandfather         endarterectomy   • Leukemia Paternal Grandfather        Surgical History:   Past Surgical History:   Procedure Laterality Date   • PILONIDAL CYSTECTOMY  2014    no recurrence.    • VARICOCELE EXCISION Left 2009    due to pain and swelling, sx resolved post-op         Current Outpatient Medications:   •  cholecalciferol (VITAMIN D3) 1000 units tablet, Take 1,000 Units by mouth Daily., Disp: , Rfl:   •  Protein powder, , Disp: , Rfl:   •  vitamin B-12 (CYANOCOBALAMIN) 1000 MCG tablet, Take 2 PO daily, Disp: 60 tablet, Rfl: 5  •  omeprazole (PRILOSEC) 20 MG capsule, Take 1 capsule by mouth Daily., Disp: 30 capsule, Rfl: 0    Vitals:    03/14/19 1507   BP: 124/70   Pulse: 83   Resp: 12   Temp: 99 °F (37.2 °C)   SpO2: 99%       Physical Exam   Constitutional: He is oriented  to person, place, and time. He appears well-developed and well-nourished. He is cooperative. No distress.   HENT:   Head: Normocephalic and atraumatic.   Right Ear: Hearing and external ear normal.   Left Ear: Hearing, tympanic membrane, external ear and ear canal normal.   Nose: Nose normal.   Mouth/Throat: Uvula is midline, oropharynx is clear and moist and mucous membranes are normal. No oropharyngeal exudate.   Cerumen obscures full on R, removed to clear with pick by MA   Eyes: Conjunctivae, EOM and lids are normal. Pupils are equal, round, and reactive to light. Right eye exhibits no discharge. Left eye exhibits no discharge. No scleral icterus.   Neck: Normal range of motion and full passive range of motion without pain. Neck supple. Carotid bruit is not present. No thyroid mass and no thyromegaly present.   Cardiovascular: Normal rate, regular rhythm, S1 normal, S2 normal, normal heart sounds and intact distal pulses. Exam reveals no gallop and no friction rub.   No murmur heard.  Pulses:       Radial pulses are 2+ on the right side, and 2+ on the left side.        Dorsalis pedis pulses are 2+ on the right side, and 2+ on the left side.        Posterior tibial pulses are 2+ on the right side, and 2+ on the left side.   Pulmonary/Chest: Effort normal and breath sounds normal. No respiratory distress. He has no wheezes. He has no rhonchi. He has no rales.   Abdominal: Soft. Bowel sounds are normal. He exhibits no distension and no mass. There is no hepatosplenomegaly. There is no tenderness. There is no rebound and no guarding. Hernia confirmed negative in the right inguinal area and confirmed negative in the left inguinal area.   Genitourinary: Testes normal and penis normal. Right testis shows no mass. Left testis shows no mass. No discharge found.   Musculoskeletal: Normal range of motion. He exhibits no edema.     Vascular Status -  His right foot exhibits normal foot vasculature  and no edema. His left  foot exhibits normal foot vasculature  and no edema.  Skin Integrity  -  His right foot skin is intact.His left foot skin is intact..  Lymphadenopathy:     He has no cervical adenopathy.     He has no axillary adenopathy.        Right: No inguinal adenopathy present.        Left: No inguinal adenopathy present.   Neurological: He is alert and oriented to person, place, and time. He has normal strength and normal reflexes. He displays no tremor. No cranial nerve deficit or sensory deficit. He exhibits normal muscle tone. Gait normal.   Skin: Skin is warm, dry and intact. No rash noted.        Psychiatric: He has a normal mood and affect. His speech is normal and behavior is normal. Thought content normal. Cognition and memory are normal.   Vitals reviewed.      Assessment/ Plan  Diagnoses and all orders for this visit:    Acne vulgaris    Strain of left Achilles tendon, subsequent encounter    Vegan diet    Healthcare maintenance  -     Tdap Vaccine Greater Than or Equal To 8yo IM    Gastroesophageal reflux disease, esophagitis presence not specified  -     omeprazole (PRILOSEC) 20 MG capsule; Take 1 capsule by mouth Daily.    Other orders  -     cholecalciferol (VITAMIN D3) 1000 units tablet; Take 1,000 Units by mouth Daily.        Return in about 1 year (around 3/14/2020) for Annual physical.      Discussion:  Homer Tamez is a 23 y.o. male RTC in yearly CPE, review of medical issues:     1. L ankle Achilles tendonitis - largely resolved, but still has some running limitations.  C/W stretches.    2. Acne, primarily closed comedones on back - stable, not bothersome to pt.  Monitor.   3. Vegan diet - for last ~ 3 years. Remains on B12 daily and Vitamin D3 daily.  B12 levels robust in 2018.   4. Anxiety, recurrent, with past panic events at night - has improved, no additional panic attacks. No longer seeing therapist, Claude Druitt. Never was on SSRI's.  Libido and ED issue has largely resolved as mood has  improved.   5. Cerumen impaction R > L - removed by pick in office today by M.A.  Recommend hydrogen peroxide drops prior to shower a few times weekly.   6. Mucous in throat - new issue today, only after coffee intake. I suspect is due to reflux. Will trial PPI for one month and if resolved will need to consider alternate dietary coffee intake vs. Daily acid suppression.  7. HM - labs d/w pt; Flu - next season; Tdap - today; Hep A/ Hep B/ Menactra -UTD;  consider Gardasil; U G/C screen (-); c/w exercise.     RTC one year CPE, F labs prior

## 2019-04-13 DIAGNOSIS — K21.9 GASTROESOPHAGEAL REFLUX DISEASE, ESOPHAGITIS PRESENCE NOT SPECIFIED: ICD-10-CM

## 2019-04-15 RX ORDER — OMEPRAZOLE 20 MG/1
CAPSULE, DELAYED RELEASE ORAL
Qty: 30 CAPSULE | Refills: 0 | Status: SHIPPED | OUTPATIENT
Start: 2019-04-15 | End: 2020-07-10

## 2019-06-25 ENCOUNTER — OFFICE VISIT (OUTPATIENT)
Dept: INTERNAL MEDICINE | Facility: CLINIC | Age: 24
End: 2019-06-25

## 2019-06-25 VITALS
HEART RATE: 53 BPM | SYSTOLIC BLOOD PRESSURE: 128 MMHG | BODY MASS INDEX: 27.4 KG/M2 | TEMPERATURE: 98.2 F | DIASTOLIC BLOOD PRESSURE: 80 MMHG | OXYGEN SATURATION: 99 % | HEIGHT: 76 IN | WEIGHT: 225 LBS

## 2019-06-25 DIAGNOSIS — N52.9 ERECTILE DYSFUNCTION, UNSPECIFIED ERECTILE DYSFUNCTION TYPE: Primary | ICD-10-CM

## 2019-06-25 PROCEDURE — 99214 OFFICE O/P EST MOD 30 MIN: CPT | Performed by: INTERNAL MEDICINE

## 2019-06-25 NOTE — PROGRESS NOTES
"Erectile Dysfunction      HPI  Homer PERSAUD Willinger is a 23 y.o. male RTC In acute care:   \"Having trouble with my erections lately\".  Noted happened in past, \"went away and now it has come back\".  Has issues with maintenance and getting erections.  Has current partner and only one partner right now.  No issues with orgasm or ejaculation.  No pelvic pain. No urinary sx, no dysuria.  NO curvature to penis.   No mood changes. No new stressors noted.   No issues with masturbation.  Can achieve orgasm.   Never had work-up for issue in past.   Partner is not aware of issue as not had sex.  Prior partner was lost to follow-up after had occurrence.  Issue occurred several times with that partner.   Did order sildenafil online and \"that worked out pretty well\".  Got from \"[email or web address]\" but not sure where it came from. Spoke to physicians on-line. Had some facial flushing when used.  Started with 40mg and then cut down to 20mg and that worked well.       Review of Systems   Constitution: Negative for chills, fever and malaise/fatigue.   Cardiovascular: Negative for dyspnea on exertion.   Respiratory: Negative for shortness of breath.    Genitourinary: Negative for dysuria, frequency, hematuria and hesitancy.       The following portions of the patient's history were reviewed and updated as appropriate: allergies, current medications, past medical history, past social history and problem list.      Current Outpatient Medications:   •  cholecalciferol (VITAMIN D3) 1000 units tablet, Take 1,000 Units by mouth Daily., Disp: , Rfl:   •  omeprazole (priLOSEC) 20 MG capsule, TAKE 1 CAPSULE BY MOUTH EVERY DAY, Disp: 30 capsule, Rfl: 0  •  Protein powder, , Disp: , Rfl:   •  vitamin B-12 (CYANOCOBALAMIN) 1000 MCG tablet, Take 2 PO daily, Disp: 60 tablet, Rfl: 5    Vitals:    06/25/19 1047   BP: 128/80   Pulse: 53   Temp: 98.2 °F (36.8 °C)   SpO2: 99%   Weight: 102 kg (225 lb)   Height: 193 cm (76\")         Physical Exam   Constitutional: He " is oriented to person, place, and time. He appears well-developed and well-nourished. No distress.   Muscular habitus   HENT:   Head: Normocephalic and atraumatic.   Mouth/Throat: Oropharynx is clear and moist. No oropharyngeal exudate.   Eyes: Pupils are equal, round, and reactive to light.   Neck: Normal range of motion. Neck supple. Carotid bruit is not present.   Cardiovascular: Normal rate, regular rhythm and normal heart sounds.   Pulses:       Carotid pulses are 2+ on the right side, and 2+ on the left side.       Radial pulses are 2+ on the right side, and 2+ on the left side.   Pulmonary/Chest: Effort normal and breath sounds normal. No respiratory distress. He has no wheezes. He has no rales.   Abdominal: Soft. Bowel sounds are normal. He exhibits no distension and no mass. There is no tenderness. There is no guarding. Hernia confirmed negative in the right inguinal area and confirmed negative in the left inguinal area.   Genitourinary: Rectum normal, testes normal and penis normal. Rectal exam shows no external hemorrhoid, no internal hemorrhoid and anal tone normal. Prostate is tender ( after exam, sharp pain/ mild, rapid resolution). Prostate is not enlarged (slighlty full prostate on exam, soft). Right testis shows no mass, no swelling and no tenderness. Right testis is descended. Left testis shows no mass, no swelling and no tenderness. Left testis is descended. Circumcised. No penile erythema or penile tenderness. No discharge found.   Musculoskeletal: He exhibits no edema.   Lymphadenopathy:     He has no cervical adenopathy.     He has no axillary adenopathy. No inguinal adenopathy noted on the right or left side.        Right: No inguinal adenopathy present.        Left: No inguinal adenopathy present.   Neurological: He is alert and oriented to person, place, and time. No cranial nerve deficit.   Skin: No rash noted.   Full facial hair noted; diffuse normal body hair including pubic hair.     Psychiatric: He has a normal mood and affect. His behavior is normal.   Vitals reviewed.      Assessment/ Plan  Diagnoses and all orders for this visit:    Erectile dysfunction, unspecified erectile dysfunction type  -     UA / M With / Rflx Culture([facility] ONLY) - Urine, Clean Catch  -     TSH  -     CBC & Differential  -     Comprehensive Metabolic Panel  -     HIV-1 / O / 2 Ag / Antibody 4th Generation  -     Chlamydia trachomatis, Neisseria gonorrhoeae, PCR - , Urine, Clean Catch        Return for Next scheduled follow up.      Discussion:  Homer Tamez is a 23 y.o. male RTC in acute care (new issue to examiner) with variable events of ED issues recently with note of events occurring in past. Pt has normal exam overall with only equivocal mild discomfort on prostate exam.  Pt has no issues with masturbation and is no longer with partner where ED issues occurred.  Pt is very physically active and has muscular habitus/ full body/ facial hair. I have very low suspicion that this is testosterone related and will hold on that lab w/u today. Low risk for vascular disease.   Will send basic labs as noted above and include STD screen.  Exam is really only equivocal for prostatitis.  Will assess labs and call and d/w pt after returns; ultimately I suspect this is psychosomatic in nature.  Has sildenafil he got on-line and tolerating well, readdress use after labs.     RTC as scheduled.

## 2019-06-27 LAB
ALBUMIN SERPL-MCNC: 4.7 G/DL (ref 3.5–5.2)
ALBUMIN/GLOB SERPL: 1.7 G/DL
ALP SERPL-CCNC: 74 U/L (ref 39–117)
ALT SERPL-CCNC: 13 U/L (ref 1–41)
APPEARANCE UR: CLEAR
AST SERPL-CCNC: 17 U/L (ref 1–40)
BACTERIA #/AREA URNS HPF: NORMAL /HPF
BASOPHILS # BLD AUTO: 0.02 10*3/MM3 (ref 0–0.2)
BASOPHILS NFR BLD AUTO: 0.3 % (ref 0–1.5)
BILIRUB SERPL-MCNC: 0.5 MG/DL (ref 0.2–1.2)
BILIRUB UR QL STRIP: NEGATIVE
BUN SERPL-MCNC: 12 MG/DL (ref 6–20)
BUN/CREAT SERPL: 10.3 (ref 7–25)
C TRACH RRNA SPEC QL NAA+PROBE: NEGATIVE
CALCIUM SERPL-MCNC: 9.5 MG/DL (ref 8.6–10.5)
CHLORIDE SERPL-SCNC: 101 MMOL/L (ref 98–107)
CO2 SERPL-SCNC: 24 MMOL/L (ref 22–29)
COLOR UR: YELLOW
CREAT SERPL-MCNC: 1.16 MG/DL (ref 0.76–1.27)
EOSINOPHIL # BLD AUTO: 0.16 10*3/MM3 (ref 0–0.4)
EOSINOPHIL NFR BLD AUTO: 2.2 % (ref 0.3–6.2)
EPI CELLS #/AREA URNS HPF: NORMAL /HPF
ERYTHROCYTE [DISTWIDTH] IN BLOOD BY AUTOMATED COUNT: 12.5 % (ref 12.3–15.4)
GLOBULIN SER CALC-MCNC: 2.8 GM/DL
GLUCOSE SERPL-MCNC: 90 MG/DL (ref 65–99)
GLUCOSE UR QL: NEGATIVE
HCT VFR BLD AUTO: 47.7 % (ref 37.5–51)
HGB BLD-MCNC: 15.4 G/DL (ref 13–17.7)
HGB UR QL STRIP: NEGATIVE
HIV 1+2 AB+HIV1 P24 AG SERPL QL IA: NON REACTIVE
IMM GRANULOCYTES # BLD AUTO: 0.02 10*3/MM3 (ref 0–0.05)
IMM GRANULOCYTES NFR BLD AUTO: 0.3 % (ref 0–0.5)
KETONES UR QL STRIP: NEGATIVE
LEUKOCYTE ESTERASE UR QL STRIP: NEGATIVE
LYMPHOCYTES # BLD AUTO: 1.4 10*3/MM3 (ref 0.7–3.1)
LYMPHOCYTES NFR BLD AUTO: 19 % (ref 19.6–45.3)
MCH RBC QN AUTO: 32.6 PG (ref 26.6–33)
MCHC RBC AUTO-ENTMCNC: 32.3 G/DL (ref 31.5–35.7)
MCV RBC AUTO: 100.8 FL (ref 79–97)
MICRO URNS: NORMAL
MICRO URNS: NORMAL
MONOCYTES # BLD AUTO: 0.46 10*3/MM3 (ref 0.1–0.9)
MONOCYTES NFR BLD AUTO: 6.3 % (ref 5–12)
N GONORRHOEA RRNA SPEC QL NAA+PROBE: NEGATIVE
NEUTROPHILS # BLD AUTO: 5.29 10*3/MM3 (ref 1.7–7)
NEUTROPHILS NFR BLD AUTO: 71.9 % (ref 42.7–76)
NITRITE UR QL STRIP: NEGATIVE
PH UR STRIP: 6 [PH] (ref 5–7.5)
PLATELET # BLD AUTO: 241 10*3/MM3 (ref 140–450)
POTASSIUM SERPL-SCNC: 4.4 MMOL/L (ref 3.5–5.2)
PROT SERPL-MCNC: 7.5 G/DL (ref 6–8.5)
PROT UR QL STRIP: NEGATIVE
RBC # BLD AUTO: 4.73 10*6/MM3 (ref 4.14–5.8)
RBC #/AREA URNS HPF: NORMAL /HPF
SODIUM SERPL-SCNC: 139 MMOL/L (ref 136–145)
SP GR UR: 1.01 (ref 1–1.03)
TSH SERPL DL<=0.005 MIU/L-ACNC: 3.4 MIU/ML (ref 0.27–4.2)
URINALYSIS REFLEX: NORMAL
UROBILINOGEN UR STRIP-MCNC: 0.2 MG/DL (ref 0.2–1)
WBC # BLD AUTO: 7.35 10*3/MM3 (ref 3.4–10.8)
WBC #/AREA URNS HPF: NORMAL /HPF

## 2020-02-27 DIAGNOSIS — Z00.00 HEALTHCARE MAINTENANCE: Primary | ICD-10-CM

## 2020-03-12 LAB
ALBUMIN SERPL-MCNC: 4.7 G/DL (ref 3.5–5.2)
ALBUMIN/GLOB SERPL: 2 G/DL
ALP SERPL-CCNC: 66 U/L (ref 39–117)
ALT SERPL-CCNC: 23 U/L (ref 1–41)
AST SERPL-CCNC: 21 U/L (ref 1–40)
BASOPHILS # BLD AUTO: 0.03 10*3/MM3 (ref 0–0.2)
BASOPHILS NFR BLD AUTO: 0.5 % (ref 0–1.5)
BILIRUB SERPL-MCNC: 0.5 MG/DL (ref 0.2–1.2)
BUN SERPL-MCNC: 24 MG/DL (ref 6–20)
BUN/CREAT SERPL: 17.9 (ref 7–25)
CALCIUM SERPL-MCNC: 9.2 MG/DL (ref 8.6–10.5)
CHLORIDE SERPL-SCNC: 99 MMOL/L (ref 98–107)
CHOLEST SERPL-MCNC: 138 MG/DL (ref 0–200)
CO2 SERPL-SCNC: 24.9 MMOL/L (ref 22–29)
CREAT SERPL-MCNC: 1.34 MG/DL (ref 0.76–1.27)
EOSINOPHIL # BLD AUTO: 0.13 10*3/MM3 (ref 0–0.4)
EOSINOPHIL NFR BLD AUTO: 2.4 % (ref 0.3–6.2)
ERYTHROCYTE [DISTWIDTH] IN BLOOD BY AUTOMATED COUNT: 11.8 % (ref 12.3–15.4)
GLOBULIN SER CALC-MCNC: 2.4 GM/DL
GLUCOSE SERPL-MCNC: 87 MG/DL (ref 65–99)
HCT VFR BLD AUTO: 45.8 % (ref 37.5–51)
HGB BLD-MCNC: 15.8 G/DL (ref 13–17.7)
IMM GRANULOCYTES # BLD AUTO: 0.01 10*3/MM3 (ref 0–0.05)
IMM GRANULOCYTES NFR BLD AUTO: 0.2 % (ref 0–0.5)
LDLC SERPL DIRECT ASSAY-MCNC: 94 MG/DL (ref 0–100)
LYMPHOCYTES # BLD AUTO: 1.58 10*3/MM3 (ref 0.7–3.1)
LYMPHOCYTES NFR BLD AUTO: 28.6 % (ref 19.6–45.3)
MCH RBC QN AUTO: 32.7 PG (ref 26.6–33)
MCHC RBC AUTO-ENTMCNC: 34.5 G/DL (ref 31.5–35.7)
MCV RBC AUTO: 94.8 FL (ref 79–97)
MONOCYTES # BLD AUTO: 0.41 10*3/MM3 (ref 0.1–0.9)
MONOCYTES NFR BLD AUTO: 7.4 % (ref 5–12)
NEUTROPHILS # BLD AUTO: 3.36 10*3/MM3 (ref 1.7–7)
NEUTROPHILS NFR BLD AUTO: 60.9 % (ref 42.7–76)
NRBC BLD AUTO-RTO: 0 /100 WBC (ref 0–0.2)
PLATELET # BLD AUTO: 265 10*3/MM3 (ref 140–450)
POTASSIUM SERPL-SCNC: 4.3 MMOL/L (ref 3.5–5.2)
PROT SERPL-MCNC: 7.1 G/DL (ref 6–8.5)
RBC # BLD AUTO: 4.83 10*6/MM3 (ref 4.14–5.8)
SODIUM SERPL-SCNC: 139 MMOL/L (ref 136–145)
UNABLE TO VOID: NORMAL
WBC # BLD AUTO: 5.52 10*3/MM3 (ref 3.4–10.8)

## 2020-03-24 ENCOUNTER — OFFICE VISIT (OUTPATIENT)
Dept: INTERNAL MEDICINE | Facility: CLINIC | Age: 25
End: 2020-03-24

## 2020-03-24 VITALS
RESPIRATION RATE: 12 BRPM | BODY MASS INDEX: 27.16 KG/M2 | TEMPERATURE: 98.9 F | SYSTOLIC BLOOD PRESSURE: 128 MMHG | HEART RATE: 104 BPM | WEIGHT: 223 LBS | HEIGHT: 76 IN | DIASTOLIC BLOOD PRESSURE: 80 MMHG | OXYGEN SATURATION: 96 %

## 2020-03-24 DIAGNOSIS — H61.21 IMPACTED CERUMEN OF RIGHT EAR: ICD-10-CM

## 2020-03-24 DIAGNOSIS — L70.0 ACNE VULGARIS: ICD-10-CM

## 2020-03-24 DIAGNOSIS — Z00.00 HEALTHCARE MAINTENANCE: Primary | ICD-10-CM

## 2020-03-24 DIAGNOSIS — R79.89 CREATININE ELEVATION: ICD-10-CM

## 2020-03-24 DIAGNOSIS — H91.20 SUDDEN-ONSET SENSORINEURAL HEARING LOSS: ICD-10-CM

## 2020-03-24 PROBLEM — S86.019A STRAIN OF ACHILLES TENDON: Status: RESOLVED | Noted: 2017-07-13 | Resolved: 2020-03-24

## 2020-03-24 PROBLEM — Z78.9 VEGAN DIET: Status: RESOLVED | Noted: 2017-03-03 | Resolved: 2020-03-24

## 2020-03-24 PROCEDURE — 99395 PREV VISIT EST AGE 18-39: CPT | Performed by: INTERNAL MEDICINE

## 2020-03-24 NOTE — PROGRESS NOTES
"Annual Exam (review of medical issues )      HPI  Homer Tamez is a 24 y.o. male  RTC in yearly CPE, review of medical issues:  Has been doing well overall.  Notes that had some issues in L ear that started a few months ago. Had some hearing loss and ringing in L ear. Saw ENT.  Got 20 days of prednisone and another med \"I forgot the name of it\".  Planned MRI but was deferred due to COVID issue.  Saw Dr. Mark Severtson.  Ear is better, but still has some slight hearing loss and occasionally ringing in ear.  No illness around time of event. 'Came out of nowhere\". Pt is not sure of what dx was.   1. L ankle Achilles tendonitis - largely resolved, but still has some running limitations. Only 'occasional discomfort'.  C/W stretches.    2. Acne, primarily closed comedones on back - stable, not bothersome to pt. A\"Still have bacne... That never seems to go away\".   3. Anxiety, recurrent, with past panic events at night - \"mood is good\".  No longer sees therapist. ED issues are better. Feels like was anxiety.        Review of Systems   Constitution: Negative for chills, fever, malaise/fatigue, weight gain and weight loss.   HENT: Positive for hearing loss (L ear subacute) and tinnitus (L ear, subactue). Negative for congestion, hoarse voice, odynophagia and sore throat.    Eyes: Negative for discharge, double vision, pain and redness.        Last eye exam > 10 years; no glasses     Cardiovascular: Negative for chest pain, dyspnea on exertion, irregular heartbeat, leg swelling, near-syncope, palpitations and syncope.   Respiratory: Negative for cough and shortness of breath.    Endocrine: Negative for polydipsia, polyphagia and polyuria.   Hematologic/Lymphatic: Negative for bleeding problem. Does not bruise/bleed easily.   Skin: Negative for rash and suspicious lesions.   Musculoskeletal: Negative for joint pain, joint swelling, muscle cramps, muscle weakness and myalgias.   Gastrointestinal: Negative for constipation, " diarrhea, dysphagia, heartburn, nausea and vomiting.   Genitourinary: Negative for dysuria, frequency, hematuria and hesitancy.   Neurological: Negative for dizziness, headaches and light-headedness.   Psychiatric/Behavioral: Negative for depression. The patient does not have insomnia and is not nervous/anxious.    Allergic/Immunologic: Negative for environmental allergies and persistent infections.       Problem List:    Patient Active Problem List   Diagnosis   • Acne vulgaris   • Sudden-onset sensorineural hearing loss       Medical History:    Past Medical History:   Diagnosis Date   • Acne vulgaris 3/3/2017    Face and back; primarily closed comedones    • Left varicocele     s/p surgical correction in 2009   • Pilonidal cyst    • Strain of Achilles tendon 7/13/2017   • Sudden-onset sensorineural hearing loss 2019    L; saw ENT, s/p steroids   • Vegan diet 3/3/2017        Social History:    Social History     Socioeconomic History   • Marital status: Single     Spouse name: Not on file   • Number of children: 0   • Years of education: Not on file   • Highest education level: Not on file   Occupational History   • Occupation: Student, 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[employer]; exercise science   • Occupation:      Comment: [employer]   Tobacco Use   • Smoking status: Never Smoker   • Smokeless tobacco: Never Used   Substance and Sexual Activity   • Alcohol use: Yes     Comment: beer once weekly   • Drug use: No     Types: Marijuana     Comment: 1-2x/ week; smoke; quit in summer 2017   • Sexual activity: Yes     Partners: Female     Birth control/protection: Condom     Comment: one partner; no hx STD's; not active in 2019   Lifestyle   • Physical activity:     Days per week: 6 days     Minutes per session: 60 min   • Stress: Not on file   Social History Narrative    Diet - Vegan [id number]; back to some animal based protein in 2020    Exercise - 6x/ week weights >> cardio    Caffeine - Coffee daily, 2-4  cups       Family History:   Family History   Problem Relation Age of Onset   • Asthma Mother    • Hyperlipidemia Father    • Hypertension Father    • Depression Father    • Anxiety disorder Father    • Sleep apnea Father    • Hernia Father    • Depression Brother    • Anxiety disorder Brother    • Hyperlipidemia Maternal Grandmother    • Hypertension Maternal Grandmother    • Stroke Maternal Grandmother    • Heart attack Maternal Grandmother    • Breast cancer Maternal Grandmother    • Asthma Maternal Grandmother    • Heart attack Maternal Grandfather         51   • Other Paternal Grandmother         brain tumor   • Hypertension Paternal Grandmother    • Sleep apnea Paternal Grandmother    • Drug abuse Paternal Grandmother    • Osteoporosis Paternal Grandmother    • Alcohol abuse Paternal Grandfather    • Hypertension Paternal Grandfather    • Liver cancer Paternal Grandfather    • Glaucoma Paternal Grandfather    • Heart attack Paternal Grandfather    • Other Paternal Grandfather         endarterectomy   • Leukemia Paternal Grandfather        Surgical History:   Past Surgical History:   Procedure Laterality Date   • PILONIDAL CYSTECTOMY  2014    no recurrence.    • VARICOCELE EXCISION Left 2009    due to pain and swelling, sx resolved post-op         Current Outpatient Medications:   •  cholecalciferol (VITAMIN D3) 1000 units tablet, Take 1,000 Units by mouth Daily., Disp: , Rfl:   •  omeprazole (priLOSEC) 20 MG capsule, TAKE 1 CAPSULE BY MOUTH EVERY DAY, Disp: 30 capsule, Rfl: 0  •  Protein powder, , Disp: , Rfl:   •  vitamin B-12 (CYANOCOBALAMIN) 1000 MCG tablet, Take 2 PO daily, Disp: 60 tablet, Rfl: 5  No current facility-administered medications for this visit.     Vitals:    03/24/20 0855   BP: 128/80   Pulse: 104   Resp: 12   Temp: 98.9 °F (37.2 °C)   SpO2: 96%     Body mass index is 27.14 kg/m².    Physical Exam   Constitutional: He is oriented to person, place, and time. He appears well-developed and  well-nourished. He is cooperative. No distress.   HENT:   Head: Normocephalic and atraumatic.   Right Ear: Tympanic membrane, external ear and ear canal normal.   Left Ear: Tympanic membrane, external ear and ear canal normal.   Nose: Nose normal.   Mouth/Throat: Uvula is midline, oropharynx is clear and moist and mucous membranes are normal. No oropharyngeal exudate.   Cerumen impaction noted on R; removed by nasal pick and revealed normal TM and canal.   Eyes: Pupils are equal, round, and reactive to light. Conjunctivae, EOM and lids are normal. Right eye exhibits no discharge. Left eye exhibits no discharge. No scleral icterus.   Neck: Normal range of motion and full passive range of motion without pain. Neck supple. Carotid bruit is not present. No thyroid mass and no thyromegaly present.   Cardiovascular: Normal rate, regular rhythm, S1 normal, S2 normal and normal heart sounds. Exam reveals no gallop and no friction rub.   No murmur heard.  Pulses:       Radial pulses are 2+ on the right side, and 2+ on the left side.        Dorsalis pedis pulses are 2+ on the right side, and 2+ on the left side.        Posterior tibial pulses are 2+ on the right side, and 2+ on the left side.   Pulmonary/Chest: Effort normal and breath sounds normal. No respiratory distress. He has no wheezes. He has no rhonchi. He has no rales.   Abdominal: Soft. Bowel sounds are normal. He exhibits no distension and no mass. There is no hepatosplenomegaly. There is no tenderness. There is no rebound and no guarding. Hernia confirmed negative in the right inguinal area and confirmed negative in the left inguinal area.   Genitourinary: Testes normal and penis normal. Right testis shows no mass, no swelling and no tenderness. Right testis is descended. Left testis shows no mass, no swelling and no tenderness. Left testis is descended. Circumcised.   Musculoskeletal: Normal range of motion. He exhibits no edema.     Vascular Status -  His right  foot exhibits normal foot vasculature  and no edema. His left foot exhibits normal foot vasculature  and no edema.  Skin Integrity  -  His right foot skin is intact.His left foot skin is intact..  Lymphadenopathy:     He has no cervical adenopathy.     He has no axillary adenopathy. No inguinal adenopathy noted on the right or left side.        Right: No inguinal adenopathy present.        Left: No inguinal adenopathy present.   Neurological: He is alert and oriented to person, place, and time. He has normal strength and normal reflexes. He displays no tremor. No cranial nerve deficit or sensory deficit. He exhibits normal muscle tone. Gait normal.   Skin: Skin is warm, dry and intact. No rash noted.   Psychiatric: He has a normal mood and affect. His speech is normal and behavior is normal. Thought content normal. Cognition and memory are normal.   Vitals reviewed.      Assessment/ Plan  Diagnoses and all orders for this visit:    Healthcare maintenance  -     HPV 9-Valent Recomb Vaccine suspension 1 dose  -     UA / M With / Rflx Culture([facility] ONLY) - Urine, Clean Catch    Acne vulgaris    Sudden-onset sensorineural hearing loss    Impacted cerumen of right ear    Creatinine elevation  -     UA / M With / Rflx Culture([facility] ONLY) - Urine, Clean Catch        Return in about 1 year (around 3/24/2021) for Annual physical.      Discussion:  Homer PERSAUD Dashawn is a 24 y.o. male  RTC in yearly CPE, review of medical issues:    1. Sudden sensorineural hearing loss L - started in 2020, saw ENT and had steroid oral tx.  Has improved, but not full resolved with noted mild hearing loss and intermittent tinnitus.  Will need MRI brain once available (COVID) and f/u with ENT. Exam unremarkable.    2. Hx of L ankle Achilles tendonitis - largely resolved. C/W stretches.    3. Acne, primarily closed comedones on back - stable, not bothersome to pt. Declines tx at this time.    4. Anxiety, recurrent, with hx of panic events -  mood stable, feels controlled.  No longer sees therapist, Claude Druitt. Libido and ED issue has largely resolved as mood has improved.   5. Creatinine elevation - noted on labs, mild.  I think likely due to higher than average muscle mass, but will get U/A today to ensure no active process suggested.    6. HM - labs d/w pt; Flu - next season, counseled; Tdap/ Hep A/ Hep B/ Menactra -UTD; Gardasil #1 today; U G/C and HIV screen (-) 6/2019, declines retest today; c/w exercise and TLC diet.     RTC one year CPE, F labs prior  Gardasil #2 in 1 month, #3 in 6 months.

## 2020-03-25 LAB
APPEARANCE UR: CLEAR
BACTERIA #/AREA URNS HPF: NORMAL /HPF
BILIRUB UR QL STRIP: NEGATIVE
COLOR UR: YELLOW
EPI CELLS #/AREA URNS HPF: NORMAL /HPF (ref 0–10)
GLUCOSE UR QL: NEGATIVE
HGB UR QL STRIP: NEGATIVE
KETONES UR QL STRIP: NEGATIVE
LEUKOCYTE ESTERASE UR QL STRIP: NEGATIVE
MICRO URNS: NORMAL
MICRO URNS: NORMAL
NITRITE UR QL STRIP: NEGATIVE
PH UR STRIP: 7.5 [PH] (ref 5–7.5)
PROT UR QL STRIP: NEGATIVE
RBC #/AREA URNS HPF: NORMAL /HPF (ref 0–2)
SP GR UR: 1.01 (ref 1–1.03)
URINALYSIS REFLEX: NORMAL
UROBILINOGEN UR STRIP-MCNC: 0.2 MG/DL (ref 0.2–1)
WBC #/AREA URNS HPF: NORMAL /HPF (ref 0–5)

## 2020-04-24 ENCOUNTER — LAB (OUTPATIENT)
Dept: INTERNAL MEDICINE | Facility: CLINIC | Age: 25
End: 2020-04-24

## 2020-04-24 DIAGNOSIS — Z23 NEED FOR HPV VACCINATION: Primary | ICD-10-CM

## 2020-04-24 PROCEDURE — 90471 IMMUNIZATION ADMIN: CPT | Performed by: INTERNAL MEDICINE

## 2020-04-24 PROCEDURE — 90651 9VHPV VACCINE 2/3 DOSE IM: CPT | Performed by: INTERNAL MEDICINE

## 2020-07-10 ENCOUNTER — OFFICE VISIT (OUTPATIENT)
Dept: INTERNAL MEDICINE | Facility: CLINIC | Age: 25
End: 2020-07-10

## 2020-07-10 VITALS
HEIGHT: 76 IN | WEIGHT: 216 LBS | HEART RATE: 55 BPM | TEMPERATURE: 97.3 F | DIASTOLIC BLOOD PRESSURE: 80 MMHG | BODY MASS INDEX: 26.3 KG/M2 | SYSTOLIC BLOOD PRESSURE: 128 MMHG | OXYGEN SATURATION: 99 %

## 2020-07-10 DIAGNOSIS — L29.0 ANAL ITCHING: ICD-10-CM

## 2020-07-10 DIAGNOSIS — R59.1 LYMPHADENOPATHY OF HEAD AND NECK: Primary | ICD-10-CM

## 2020-07-10 DIAGNOSIS — K64.4 EXTERNAL HEMORRHOID: ICD-10-CM

## 2020-07-10 DIAGNOSIS — H61.23 BILATERAL IMPACTED CERUMEN: ICD-10-CM

## 2020-07-10 PROCEDURE — 99214 OFFICE O/P EST MOD 30 MIN: CPT | Performed by: INTERNAL MEDICINE

## 2020-07-10 RX ORDER — DIAPER,BRIEF,INFANT-TODD,DISP
EACH MISCELLANEOUS 2 TIMES DAILY
Start: 2020-07-10

## 2020-07-10 NOTE — PROGRESS NOTES
"LUMP ON BACK OF NECK      HPI  Homer Tamez is a 24 y.o. male RTC in acute care:   Noted that had 'lump back here' about one week ago. Points to posterior R side of neck. No skin changes.  No TTP.  Is not mobile.  Has been feeling well.  NO F/C. NO nigh sweats. Has smaller lesion on the L side but is so small not always able to palpate.    Notes at end of visit some 'itchy anus' for last several weeks.  No bleeding. No mass or noted hemorrhoids.  Bowels are OK. No rash noted.     Review of Systems   Constitution: Negative for chills, fever, malaise/fatigue and night sweats.   HENT: Negative for congestion and sore throat.    Cardiovascular: Negative for dyspnea on exertion.   Respiratory: Negative for cough and shortness of breath.    Skin: Negative for color change, rash and suspicious lesions.   Musculoskeletal: Negative for neck pain.       The following portions of the patient's history were reviewed and updated as appropriate: allergies, current medications, past medical history, past social history and problem list.      Current Outpatient Medications:   •  cholecalciferol (VITAMIN D3) 1000 units tablet, Take 1,000 Units by mouth Daily., Disp: , Rfl:   •  Protein powder, , Disp: , Rfl:   •  vitamin B-12 (CYANOCOBALAMIN) 1000 MCG tablet, Take 2 PO daily, Disp: 60 tablet, Rfl: 5  •  hydrocortisone (Preparation H) 1 % cream, Apply  topically to the appropriate area as directed 2 (Two) Times a Day., Disp: , Rfl:     Vitals:    07/10/20 1423   BP: 128/80   Pulse: 55   Temp: 97.3 °F (36.3 °C)   SpO2: 99%   Weight: 98 kg (216 lb)   Height: 193 cm (76\")     Body mass index is 26.29 kg/m².      Physical Exam   Constitutional: He is oriented to person, place, and time. He appears well-developed and well-nourished. No distress.   HENT:   Head: Normocephalic and atraumatic.   Right Ear: External ear normal.   Left Ear: External ear normal.   Mouth/Throat: Oropharynx is clear and moist. No oropharyngeal exudate, " posterior oropharyngeal edema or posterior oropharyngeal erythema.   Cerumen fully obscures TM B     Eyes: Pupils are equal, round, and reactive to light. Conjunctivae are normal. Right eye exhibits no discharge. Left eye exhibits no discharge.   Neck: Normal range of motion. Neck supple. Carotid bruit is not present.   Cardiovascular: Normal rate, regular rhythm and normal heart sounds.   Pulses:       Carotid pulses are 2+ on the right side, and 2+ on the left side.       Radial pulses are 2+ on the right side, and 2+ on the left side.   Pulmonary/Chest: Effort normal and breath sounds normal. No respiratory distress. He has no wheezes. He has no rales.   Genitourinary: Rectal exam shows external hemorrhoid (single, small). Rectal exam shows no mass, no tenderness and anal tone normal.   Musculoskeletal: He exhibits no edema.   Lymphadenopathy:        Head (right side): Occipital (single mobile, < 1 cm) adenopathy present. No submental, no submandibular, no tonsillar, no preauricular and no posterior auricular adenopathy present.        Head (left side): Occipital (shotty) adenopathy present. No submental, no submandibular, no tonsillar, no preauricular and no posterior auricular adenopathy present.     He has no axillary adenopathy.        Right: No inguinal, no supraclavicular and no epitrochlear adenopathy present.        Left: No inguinal, no supraclavicular and no epitrochlear adenopathy present.   Neurological: He is alert and oriented to person, place, and time. No cranial nerve deficit.   Psychiatric: He has a normal mood and affect. His behavior is normal.   Vitals reviewed.      Assessment/ Plan  Diagnoses and all orders for this visit:    Lymphadenopathy of head and neck  -     CBC+Platelet+Hem Review ([facility] Only)    Anal itching  -     hydrocortisone (Preparation H) 1 % cream; Apply  topically to the appropriate area as directed 2 (Two) Times a Day.    External hemorrhoid  -     hydrocortisone  (Preparation H) 1 % cream; Apply  topically to the appropriate area as directed 2 (Two) Times a Day.    Bilateral impacted cerumen        Return for Next scheduled follow up.      Discussion:  Homer Tamez is a 24 y.o. male RTC in acute care (new issues to examiner):   1. Shotty LAD occipital region - no assoicated sx, present x 1 week. Exam reassuring with subcm nodes, mobile. No other LAD noted.  Exam overall benign. Will get CBC with manual smear to eval, but suspect this is reactive.  Reassured pt and advised to track for now. Will f/u with CBC results and in 2-3 weeks over phone to see how progressing.   2. Anal itching - doorknob c/o.  Tiny hemorrhoid on exam, ? Healing fissure at 12 o'clock, but no acute open fissure.  Will have pt start sitz baths daily, fiber supplement daily, and PRN Prep-H for itching. Call if not better  3. Cerumen impaction, B - partial removed by pick with MA in office today.  Headphone use seems to be culprit here. WIll need to use daily mineral oil or 3x/ week [place] drops prior to shower and water lavage in shower to keep clear.  Counseled pt.     RTC as planned.

## 2020-07-11 LAB
BASOPHILS # BLD MANUAL: 0.1 X10E3/UL (ref 0–0.2)
BASOPHILS NFR BLD MANUAL: 1 %
EOSINOPHIL # BLD MANUAL: 0.1 X10E3/UL (ref 0–0.4)
EOSINOPHIL NFR BLD MANUAL: 2 %
ERYTHROCYTE [DISTWIDTH] IN BLOOD BY AUTOMATED COUNT: 11.6 % (ref 11.6–15.4)
HCT VFR BLD AUTO: 43.9 % (ref 37.5–51)
HGB BLD-MCNC: 15.1 G/DL (ref 13–17.7)
LYMPHOCYTES # BLD MANUAL: 1.7 X10E3/UL (ref 0.7–3.1)
LYMPHOCYTES NFR BLD MANUAL: 32 %
MCH RBC QN AUTO: 31.3 PG (ref 26.6–33)
MCHC RBC AUTO-ENTMCNC: 34.4 G/DL (ref 31.5–35.7)
MCV RBC AUTO: 91 FL (ref 79–97)
MONOCYTES # BLD MANUAL: 0.4 X10E3/UL (ref 0.1–0.9)
MONOCYTES NFR BLD MANUAL: 8 %
NEUTROPHILS # BLD MANUAL: 3.1 X10E3/UL (ref 1.4–7)
NEUTROPHILS NFR BLD MANUAL: 57 %
PLATELET # BLD AUTO: 247 X10E3/UL (ref 150–450)
PLATELET BLD QL SMEAR: ADEQUATE
RBC # BLD AUTO: 4.82 X10E6/UL (ref 4.14–5.8)
RBC MORPH BLD: NORMAL
WBC # BLD AUTO: 5.4 X10E3/UL (ref 3.4–10.8)

## 2020-09-23 ENCOUNTER — LAB (OUTPATIENT)
Dept: INTERNAL MEDICINE | Facility: CLINIC | Age: 25
End: 2020-09-23

## 2021-03-18 DIAGNOSIS — Z00.00 HEALTHCARE MAINTENANCE: Primary | ICD-10-CM

## 2021-03-22 ENCOUNTER — TELEPHONE (OUTPATIENT)
Dept: INTERNAL MEDICINE | Facility: CLINIC | Age: 26
End: 2021-03-22

## 2021-03-22 NOTE — TELEPHONE ENCOUNTER
Hub staff attempted to follow warm transfer process and was unsuccessful     Caller: Homer Tamez    Relationship to patient: Self    Best call back number: [phone]   Patient is needing: CHANGE LAB APPOINTMENT

## 2021-03-24 DIAGNOSIS — Z00.00 HEALTHCARE MAINTENANCE: Primary | ICD-10-CM

## 2021-04-03 LAB
ALBUMIN SERPL-MCNC: 5.1 G/DL (ref 3.5–5.2)
ALBUMIN/GLOB SERPL: 2.2 G/DL
ALP SERPL-CCNC: 66 U/L (ref 39–117)
ALT SERPL-CCNC: 26 U/L (ref 1–41)
APPEARANCE UR: CLEAR
AST SERPL-CCNC: 26 U/L (ref 1–40)
BACTERIA #/AREA URNS HPF: NORMAL /HPF
BASOPHILS # BLD AUTO: 0.03 10*3/MM3 (ref 0–0.2)
BASOPHILS NFR BLD AUTO: 0.3 % (ref 0–1.5)
BILIRUB SERPL-MCNC: 0.5 MG/DL (ref 0–1.2)
BILIRUB UR QL STRIP: NEGATIVE
BUN SERPL-MCNC: 16 MG/DL (ref 6–20)
BUN/CREAT SERPL: 13.9 (ref 7–25)
CALCIUM SERPL-MCNC: 9.5 MG/DL (ref 8.6–10.5)
CHLORIDE SERPL-SCNC: 100 MMOL/L (ref 98–107)
CHOLEST SERPL-MCNC: 135 MG/DL (ref 0–200)
CO2 SERPL-SCNC: 28.2 MMOL/L (ref 22–29)
COLOR UR: YELLOW
CREAT SERPL-MCNC: 1.15 MG/DL (ref 0.76–1.27)
EOSINOPHIL # BLD AUTO: 0.1 10*3/MM3 (ref 0–0.4)
EOSINOPHIL NFR BLD AUTO: 1.1 % (ref 0.3–6.2)
EPI CELLS #/AREA URNS HPF: NORMAL /HPF (ref 0–10)
ERYTHROCYTE [DISTWIDTH] IN BLOOD BY AUTOMATED COUNT: 11.9 % (ref 12.3–15.4)
GLOBULIN SER CALC-MCNC: 2.3 GM/DL
GLUCOSE SERPL-MCNC: 86 MG/DL (ref 65–99)
GLUCOSE UR QL: NEGATIVE
HCT VFR BLD AUTO: 46.5 % (ref 37.5–51)
HGB BLD-MCNC: 15.8 G/DL (ref 13–17.7)
HGB UR QL STRIP: NEGATIVE
IMM GRANULOCYTES # BLD AUTO: 0.02 10*3/MM3 (ref 0–0.05)
IMM GRANULOCYTES NFR BLD AUTO: 0.2 % (ref 0–0.5)
KETONES UR QL STRIP: NEGATIVE
LDLC SERPL DIRECT ASSAY-MCNC: 87 MG/DL (ref 0–100)
LEUKOCYTE ESTERASE UR QL STRIP: NEGATIVE
LYMPHOCYTES # BLD AUTO: 1.97 10*3/MM3 (ref 0.7–3.1)
LYMPHOCYTES NFR BLD AUTO: 21.4 % (ref 19.6–45.3)
MCH RBC QN AUTO: 31.5 PG (ref 26.6–33)
MCHC RBC AUTO-ENTMCNC: 34 G/DL (ref 31.5–35.7)
MCV RBC AUTO: 92.8 FL (ref 79–97)
MICRO URNS: NORMAL
MICRO URNS: NORMAL
MONOCYTES # BLD AUTO: 0.6 10*3/MM3 (ref 0.1–0.9)
MONOCYTES NFR BLD AUTO: 6.5 % (ref 5–12)
NEUTROPHILS # BLD AUTO: 6.5 10*3/MM3 (ref 1.7–7)
NEUTROPHILS NFR BLD AUTO: 70.5 % (ref 42.7–76)
NITRITE UR QL STRIP: NEGATIVE
NRBC BLD AUTO-RTO: 0 /100 WBC (ref 0–0.2)
PH UR STRIP: 6 [PH] (ref 5–7.5)
PLATELET # BLD AUTO: 272 10*3/MM3 (ref 140–450)
POTASSIUM SERPL-SCNC: 4.1 MMOL/L (ref 3.5–5.2)
PROT SERPL-MCNC: 7.4 G/DL (ref 6–8.5)
PROT UR QL STRIP: NEGATIVE
RBC # BLD AUTO: 5.01 10*6/MM3 (ref 4.14–5.8)
RBC #/AREA URNS HPF: NORMAL /HPF (ref 0–2)
SODIUM SERPL-SCNC: 139 MMOL/L (ref 136–145)
SP GR UR: 1.01 (ref 1–1.03)
TSH SERPL DL<=0.005 MIU/L-ACNC: 1.43 UIU/ML (ref 0.27–4.2)
URINALYSIS REFLEX: NORMAL
UROBILINOGEN UR STRIP-MCNC: 0.2 MG/DL (ref 0.2–1)
WBC # BLD AUTO: 9.22 10*3/MM3 (ref 3.4–10.8)
WBC #/AREA URNS HPF: NORMAL /HPF (ref 0–5)

## 2021-04-09 ENCOUNTER — OFFICE VISIT (OUTPATIENT)
Dept: INTERNAL MEDICINE | Facility: CLINIC | Age: 26
End: 2021-04-09

## 2021-04-09 VITALS
WEIGHT: 225 LBS | RESPIRATION RATE: 12 BRPM | HEART RATE: 60 BPM | DIASTOLIC BLOOD PRESSURE: 78 MMHG | HEIGHT: 76 IN | OXYGEN SATURATION: 98 % | BODY MASS INDEX: 27.4 KG/M2 | TEMPERATURE: 97.1 F | SYSTOLIC BLOOD PRESSURE: 130 MMHG

## 2021-04-09 DIAGNOSIS — R59.0 LYMPHADENOPATHY, CERVICAL: ICD-10-CM

## 2021-04-09 DIAGNOSIS — Z00.00 HEALTHCARE MAINTENANCE: Primary | ICD-10-CM

## 2021-04-09 DIAGNOSIS — H61.23 BILATERAL IMPACTED CERUMEN: ICD-10-CM

## 2021-04-09 DIAGNOSIS — L70.0 ACNE VULGARIS: ICD-10-CM

## 2021-04-09 DIAGNOSIS — H91.20 SUDDEN-ONSET SENSORINEURAL HEARING LOSS: ICD-10-CM

## 2021-04-09 PROCEDURE — 99395 PREV VISIT EST AGE 18-39: CPT | Performed by: INTERNAL MEDICINE

## 2021-04-09 PROCEDURE — 99212 OFFICE O/P EST SF 10 MIN: CPT | Performed by: INTERNAL MEDICINE

## 2021-04-09 NOTE — PROGRESS NOTES
"Annual Exam (review of medical issues )      HPI  Homer Tamez is a 25 y.o. male RTC in yearly CPE, review of medical issues:   Has been doing well.  Busy with work.  Health has been good.   1. Shotty LAD occipital region - noted last vist.  \"They are still there'. No change.  'I have not noticed any size difference. Onset in last year, had not felt prior.  No LAD noted elsewhere. No pain over the nodes, never were painful.  Was not sick prior to onset in past year.   2. Anal itching - noted in past.  Hemorrhoids are still there. Less itching. Not sure why better, Did use Prep-H.  Bowels are regular, no straining.   No fiber supplements.   3. Cerumen impaction, B - \"I think I have some wax again\". Feels full in ears.  No pain.  4. Sudden sensorineural hearing loss L - started in 2020, saw ENT and had steroid oral tx.  Has fully resolved. 'Back to normal\".   5. Acne, primarily closed comedones on back - stable, not bothersome to pt. 'Still there'.  Really not bothered by it. Goes away in summer.  No painful lesions.   6. Anxiety, recurrent, with hx of panic events - mood stable, feels controlled.  No longer sees therapist, Claude Druitt.   Anxiety seems better now.  Thinks issue in past was due to breakup at the time.     Review of Systems   Constitutional: Negative for chills, fever, malaise/fatigue, weight gain and weight loss.   HENT: Negative for congestion, ear discharge, ear pain (full, thinks has wax), hearing loss, odynophagia and sore throat.    Eyes: Negative for discharge, double vision, pain and redness.        Last eye exam ~9/2020   Cardiovascular: Negative for chest pain, dyspnea on exertion, irregular heartbeat, leg swelling, near-syncope, palpitations and syncope.   Respiratory: Negative for cough and shortness of breath.    Endocrine: Negative for polydipsia, polyphagia and polyuria.   Hematologic/Lymphatic: Negative for bleeding problem. Does not bruise/bleed easily.   Skin: Negative for rash " and suspicious lesions.   Musculoskeletal: Negative for joint pain, joint swelling, muscle cramps, muscle weakness and myalgias.   Gastrointestinal: Negative for change in bowel habit, constipation, diarrhea, dysphagia, heartburn, nausea and vomiting.   Genitourinary: Negative for dysuria, frequency, hematuria, hesitancy and incomplete emptying.   Neurological: Negative for dizziness, headaches and light-headedness.   Psychiatric/Behavioral: Negative for depression. The patient does not have insomnia and is not nervous/anxious.    Allergic/Immunologic: Negative for environmental allergies and persistent infections.       Problem List:    Patient Active Problem List   Diagnosis   • Acne vulgaris   • Lymphadenopathy, cervical       Medical History:    Past Medical History:   Diagnosis Date   • Acne vulgaris 3/3/2017    Face and back; primarily closed comedones    • Left varicocele     s/p surgical correction in 2009   • Pilonidal cyst    • Situational anxiety     therapist, Claude Druitt. After break-up. Resolved.    • Strain of Achilles tendon 7/13/2017   • Sudden-onset sensorineural hearing loss 2019    L; saw ENT, s/p steroids   • Vegan diet 3/3/2017        Social History:    Social History     Socioeconomic History   • Marital status: Single     Spouse name: Not on file   • Number of children: 0   • Years of education: Not on file   • Highest education level: Not on file   Tobacco Use   • Smoking status: Never Smoker   • Smokeless tobacco: Never Used   Vaping Use   • Vaping Use: Never used   Substance and Sexual Activity   • Alcohol use: Yes     Comment: beer 4 weekly on weekends   • Drug use: No     Types: Marijuana     Comment: 1-2x/ week; smoke; quit in summer 2017   • Sexual activity: Yes     Partners: Female     Birth control/protection: Condom     Comment: one partner; no hx STD's       Family History:   Family History   Problem Relation Age of Onset   • Asthma Mother    • Hyperlipidemia Father    •  Hypertension Father    • Depression Father    • Anxiety disorder Father    • Sleep apnea Father    • Hernia Father    • Depression Brother    • Anxiety disorder Brother    • Hyperlipidemia Maternal Grandmother    • Hypertension Maternal Grandmother    • Stroke Maternal Grandmother    • Heart attack Maternal Grandmother    • Breast cancer Maternal Grandmother    • Asthma Maternal Grandmother    • Heart attack Maternal Grandfather         51   • Other Paternal Grandmother         brain tumor   • Hypertension Paternal Grandmother    • Sleep apnea Paternal Grandmother    • Drug abuse Paternal Grandmother    • Osteoporosis Paternal Grandmother    • Alcohol abuse Paternal Grandfather    • Hypertension Paternal Grandfather    • Liver cancer Paternal Grandfather    • Glaucoma Paternal Grandfather    • Heart attack Paternal Grandfather    • Other Paternal Grandfather         endarterectomy   • Leukemia Paternal Grandfather        Surgical History:   Past Surgical History:   Procedure Laterality Date   • PILONIDAL CYSTECTOMY  2014    no recurrence.    • VARICOCELE EXCISION Left 2009    due to pain and swelling, sx resolved post-op         Current Outpatient Medications:   •  cholecalciferol (VITAMIN D3) 1000 units tablet, Take 1,000 Units by mouth Daily., Disp: , Rfl:   •  hydrocortisone (Preparation H) 1 % cream, Apply  topically to the appropriate area as directed 2 (Two) Times a Day., Disp: , Rfl:   •  Protein powder, , Disp: , Rfl:   •  vitamin B-12 (CYANOCOBALAMIN) 1000 MCG tablet, Take 2 PO daily, Disp: 60 tablet, Rfl: 5    Vitals:    04/09/21 1334   BP: 130/78   Pulse: 60   Resp: 12   Temp: 97.1 °F (36.2 °C)   SpO2: 98%     Body mass index is 27.39 kg/m².    Physical Exam  Vitals reviewed.   Constitutional:       General: He is not in acute distress.     Appearance: Normal appearance. He is well-developed. He is not ill-appearing or toxic-appearing.   HENT:      Head: Normocephalic and atraumatic.      Right Ear:  Hearing, tympanic membrane, ear canal and external ear normal. There is impacted cerumen.      Left Ear: Hearing, tympanic membrane, ear canal and external ear normal. There is impacted cerumen.      Ears:      Comments: Cerumen removed fully with pick by MA in office.        Nose: Nose normal.      Mouth/Throat:      Mouth: Mucous membranes are moist. No oral lesions.      Pharynx: Uvula midline. No pharyngeal swelling, oropharyngeal exudate, posterior oropharyngeal erythema or uvula swelling.   Eyes:      General: Lids are normal. No scleral icterus.        Right eye: No discharge.         Left eye: No discharge.      Extraocular Movements: Extraocular movements intact.      Conjunctiva/sclera: Conjunctivae normal.      Pupils: Pupils are equal, round, and reactive to light.   Neck:      Thyroid: No thyroid mass or thyromegaly.      Vascular: No carotid bruit.   Cardiovascular:      Rate and Rhythm: Normal rate and regular rhythm.      Pulses:           Radial pulses are 2+ on the right side and 2+ on the left side.        Dorsalis pedis pulses are 2+ on the right side and 2+ on the left side.        Posterior tibial pulses are 2+ on the right side and 2+ on the left side.      Heart sounds: Normal heart sounds, S1 normal and S2 normal. No murmur heard.   No friction rub. No gallop.    Pulmonary:      Effort: Pulmonary effort is normal. No respiratory distress.      Breath sounds: Normal breath sounds. No wheezing, rhonchi or rales.   Abdominal:      General: Bowel sounds are normal. There is no distension.      Palpations: Abdomen is soft. There is no mass.      Tenderness: There is no abdominal tenderness. There is no guarding or rebound.      Hernia: There is no hernia in the left inguinal area.   Genitourinary:     Penis: Normal. No discharge.       Testes: Normal.         Right: Mass not present.         Left: Mass not present.   Musculoskeletal:         General: Normal range of motion.      Cervical back:  Full passive range of motion without pain, normal range of motion and neck supple. No rigidity. No muscular tenderness.      Right lower leg: No edema.      Left lower leg: No edema.   Lymphadenopathy:      Cervical: Cervical adenopathy (diffuse, scattered < 1cm nodes noted in through neck. ) present.      Right cervical: Superficial cervical adenopathy, deep cervical adenopathy and posterior cervical adenopathy present.      Left cervical: Superficial cervical adenopathy, deep cervical adenopathy and posterior cervical adenopathy present.      Lower Body: Right inguinal adenopathy (single shotty node noted in R chain, non-tender. ) present. No left inguinal adenopathy.   Skin:     General: Skin is warm and dry.      Findings: Acne (scattered small closed comedones diffusely on back. ) present. No rash.   Neurological:      Mental Status: He is alert and oriented to person, place, and time.      Cranial Nerves: No cranial nerve deficit.      Sensory: No sensory deficit.      Motor: No weakness, tremor, atrophy or abnormal muscle tone.      Gait: Gait normal.      Deep Tendon Reflexes: Reflexes are normal and symmetric.      Reflex Scores:       Patellar reflexes are 2+ on the right side and 2+ on the left side.       Achilles reflexes are 2+ on the right side and 2+ on the left side.  Psychiatric:         Attention and Perception: Attention normal.         Mood and Affect: Mood normal.         Speech: Speech normal.         Behavior: Behavior normal. Behavior is cooperative.         Thought Content: Thought content normal.         Assessment/ Plan  Diagnoses and all orders for this visit:    Healthcare maintenance    Sudden-onset sensorineural hearing loss    Acne vulgaris    Lymphadenopathy, cervical  -     CT soft tissue neck w contrast; Future    Bilateral impacted cerumen        Return in about 1 year (around 4/9/2022) for Annual physical.      Discussion:  Homer Tamez is a 25 y.o. male RTC in yearly CPE,  review of medical issues:   Has been doing well.  Busy with work.  Health has been good.   1. Shotty LAD occipital region, now node noted R inguinal (single node, subcm) - noted last visit as new onset issue.  Persists with no change over time.  Shotty node noted in R inguinal region today, non-tender.  I d/w pt, challenging clinical issue as nodes are all < 1cm on exam and asx'ic. Pt has very little body fat so palpation is exaggerated.  Will proceed with CT neck soft tissues to better define and measure nodes. Reviewed Ddx with pt.   2. Anal itching with Hemorrhoids - improved from past, use Prep-H.  Advised fiber daily long term for hemorrhoids.   3. Cerumen impaction, B - recurred again today.  Removed fully by pick by MA in office today. Tolerated well. Daily mineral oil drops to ears and shower water lavage.   4. Sudden sensorineural hearing loss L - > 1 year out, s/p ENT with steroid oral tx. Resolved. Hold on MRI given full recovery noted.   5. Acne, primarily closed comedones on back - stable, not bothersome to pt. Declines tx at this time.    6. Hx of Anxiety, recurrent, with hx of panic events -s/p work with therapist, Claude Druitt, in past. Thought issue reaction to break-up. Resolved.      7. HM - labs d/w pt; Flu - next season, counseled; Tdap/ Hep A/ Hep B/ Menactra -UTD; Gardasil x2 only completed;  COVID vaccine advised, counseled at length and resource handout provided; U G/C and HIV screen (-) 6/2019; c/w exercise and TLC diet.     RTC one year CPE, F labs prior

## 2021-04-30 ENCOUNTER — HOSPITAL ENCOUNTER (OUTPATIENT)
Dept: CT IMAGING | Facility: HOSPITAL | Age: 26
Discharge: HOME | End: 2021-04-30
Admitting: INTERNAL MEDICINE

## 2021-04-30 DIAGNOSIS — R59.0 LYMPHADENOPATHY, CERVICAL: ICD-10-CM

## 2021-04-30 PROCEDURE — 70491 CT SOFT TISSUE NECK W/DYE: CPT

## 2021-04-30 PROCEDURE — 25010000002 IOPAMIDOL 61 % SOLUTION: Performed by: INTERNAL MEDICINE

## 2021-04-30 RX ADMIN — IOPAMIDOL 75 ML: 612 INJECTION, SOLUTION INTRAVENOUS at 14:34

## 2022-03-30 DIAGNOSIS — Z00.00 HEALTHCARE MAINTENANCE: Primary | ICD-10-CM

## 2022-04-09 LAB
ALBUMIN SERPL-MCNC: 4.9 G/DL (ref 4.1–5.2)
ALBUMIN/GLOB SERPL: 1.8 {RATIO} (ref 1.2–2.2)
ALP SERPL-CCNC: 67 IU/L (ref 44–121)
ALT SERPL-CCNC: 49 IU/L (ref 0–44)
APPEARANCE UR: CLEAR
AST SERPL-CCNC: 48 IU/L (ref 0–40)
BACTERIA #/AREA URNS HPF: NORMAL /[HPF]
BASOPHILS # BLD AUTO: 0 X10E3/UL (ref 0–0.2)
BASOPHILS NFR BLD AUTO: 1 %
BILIRUB SERPL-MCNC: 0.4 MG/DL (ref 0–1.2)
BILIRUB UR QL STRIP: NEGATIVE
BUN SERPL-MCNC: 24 MG/DL (ref 6–20)
BUN/CREAT SERPL: 21 (ref 9–20)
CALCIUM SERPL-MCNC: 9.8 MG/DL (ref 8.7–10.2)
CASTS URNS QL MICRO: NORMAL /LPF
CHLORIDE SERPL-SCNC: 100 MMOL/L (ref 96–106)
CHOLEST SERPL-MCNC: 132 MG/DL (ref 100–199)
CO2 SERPL-SCNC: 25 MMOL/L (ref 20–29)
COLOR UR: YELLOW
CREAT SERPL-MCNC: 1.12 MG/DL (ref 0.76–1.27)
EGFRCR SERPLBLD CKD-EPI 2021: 93 ML/MIN/1.73
EOSINOPHIL # BLD AUTO: 0.2 X10E3/UL (ref 0–0.4)
EOSINOPHIL NFR BLD AUTO: 3 %
EPI CELLS #/AREA URNS HPF: NORMAL /HPF (ref 0–10)
ERYTHROCYTE [DISTWIDTH] IN BLOOD BY AUTOMATED COUNT: 11.4 % (ref 11.6–15.4)
GLOBULIN SER CALC-MCNC: 2.7 G/DL (ref 1.5–4.5)
GLUCOSE SERPL-MCNC: 70 MG/DL (ref 65–99)
GLUCOSE UR QL STRIP: NEGATIVE
HCT VFR BLD AUTO: 48.5 % (ref 37.5–51)
HDLC SERPL-MCNC: 38 MG/DL
HGB BLD-MCNC: 15.8 G/DL (ref 13–17.7)
HGB UR QL STRIP: NEGATIVE
IMM GRANULOCYTES # BLD AUTO: 0 X10E3/UL (ref 0–0.1)
IMM GRANULOCYTES NFR BLD AUTO: 0 %
KETONES UR QL STRIP: NEGATIVE
LDLC SERPL CALC-MCNC: 71 MG/DL (ref 0–99)
LEUKOCYTE ESTERASE UR QL STRIP: NEGATIVE
LYMPHOCYTES # BLD AUTO: 2.1 X10E3/UL (ref 0.7–3.1)
LYMPHOCYTES NFR BLD AUTO: 35 %
MCH RBC QN AUTO: 30.7 PG (ref 26.6–33)
MCHC RBC AUTO-ENTMCNC: 32.6 G/DL (ref 31.5–35.7)
MCV RBC AUTO: 94 FL (ref 79–97)
MICRO URNS: NORMAL
MICRO URNS: NORMAL
MONOCYTES # BLD AUTO: 0.5 X10E3/UL (ref 0.1–0.9)
MONOCYTES NFR BLD AUTO: 7 %
NEUTROPHILS # BLD AUTO: 3.3 X10E3/UL (ref 1.4–7)
NEUTROPHILS NFR BLD AUTO: 54 %
NITRITE UR QL STRIP: NEGATIVE
PH UR STRIP: 6.5 [PH] (ref 5–7.5)
PLATELET # BLD AUTO: 255 X10E3/UL (ref 150–450)
POTASSIUM SERPL-SCNC: 4.7 MMOL/L (ref 3.5–5.2)
PROT SERPL-MCNC: 7.6 G/DL (ref 6–8.5)
PROT UR QL STRIP: NEGATIVE
RBC # BLD AUTO: 5.15 X10E6/UL (ref 4.14–5.8)
RBC #/AREA URNS HPF: NORMAL /HPF (ref 0–2)
SODIUM SERPL-SCNC: 140 MMOL/L (ref 134–144)
SP GR UR STRIP: 1.01 (ref 1–1.03)
TRIGL SERPL-MCNC: 126 MG/DL (ref 0–149)
TSH SERPL DL<=0.005 MIU/L-ACNC: 2.55 UIU/ML (ref 0.45–4.5)
URINALYSIS REFLEX: NORMAL
UROBILINOGEN UR STRIP-MCNC: 0.2 MG/DL (ref 0.2–1)
VLDLC SERPL CALC-MCNC: 23 MG/DL (ref 5–40)
WBC # BLD AUTO: 6.1 X10E3/UL (ref 3.4–10.8)
WBC #/AREA URNS HPF: NORMAL /HPF (ref 0–5)

## 2022-04-15 ENCOUNTER — OFFICE VISIT (OUTPATIENT)
Dept: INTERNAL MEDICINE | Facility: CLINIC | Age: 27
End: 2022-04-15

## 2022-04-15 VITALS
HEIGHT: 76 IN | TEMPERATURE: 97.3 F | BODY MASS INDEX: 27.4 KG/M2 | HEART RATE: 81 BPM | SYSTOLIC BLOOD PRESSURE: 120 MMHG | OXYGEN SATURATION: 95 % | DIASTOLIC BLOOD PRESSURE: 60 MMHG | WEIGHT: 225 LBS

## 2022-04-15 DIAGNOSIS — R74.8 ELEVATED LIVER ENZYMES: ICD-10-CM

## 2022-04-15 DIAGNOSIS — E78.6 LOW HDL (UNDER 40): ICD-10-CM

## 2022-04-15 DIAGNOSIS — R09.89 RIGHT CAROTID BRUIT: ICD-10-CM

## 2022-04-15 DIAGNOSIS — Z00.00 HEALTHCARE MAINTENANCE: Primary | ICD-10-CM

## 2022-04-15 DIAGNOSIS — R59.0 LYMPHADENOPATHY, OCCIPITAL: ICD-10-CM

## 2022-04-15 DIAGNOSIS — L70.0 ACNE VULGARIS: ICD-10-CM

## 2022-04-15 PROCEDURE — 99395 PREV VISIT EST AGE 18-39: CPT | Performed by: INTERNAL MEDICINE

## 2022-04-15 NOTE — PROGRESS NOTES
Annual Exam (Review of medical issues )      NARINDER  Homer PERSAUD Willinger is a 26 y.o. male RTC In yearly CPE, review of medical issues:  1. Shotty LAD occipital region, now node noted R inguinal (single node, subcm) - noted in past.  Still there but no change noted by pt.  No pain.  No F/C/Night sweats.   2. Acne, primarily closed comedones on back - stable, not bothersome to pt. No tx desired.   3. Hx of Anxiety, recurrent, with hx of panic events - s/p work with therapist, Claude Druitt, in past ~ 5 years ago. . Mood is good now.  No longer in therapy.      Review of Systems   Constitutional: Negative for chills, diaphoresis, fever, weight gain and weight loss.   HENT: Negative for congestion, hearing loss, odynophagia and sore throat.    Eyes: Negative for discharge, double vision, pain and redness.        Last eye exam ~5/2021; clear.  No glasses.    Cardiovascular: Negative for chest pain, dyspnea on exertion, irregular heartbeat, leg swelling, near-syncope, palpitations and syncope.   Respiratory: Negative for cough, shortness of breath, sleep disturbances due to breathing and snoring.    Endocrine: Negative for polydipsia, polyphagia and polyuria.   Hematologic/Lymphatic: Negative for bleeding problem. Does not bruise/bleed easily.   Skin: Negative for rash and suspicious lesions.   Musculoskeletal: Negative for joint pain, joint swelling, muscle cramps, muscle weakness and myalgias.   Gastrointestinal: Negative for bloating, abdominal pain, constipation, diarrhea, dysphagia, heartburn, nausea and vomiting.   Genitourinary: Negative for dysuria, frequency, genital sores, hematuria, hesitancy and incomplete emptying.   Neurological: Negative for excessive daytime sleepiness, dizziness, headaches and light-headedness.   Psychiatric/Behavioral: Negative for depression. The patient does not have insomnia and is not nervous/anxious.    Allergic/Immunologic: Negative for environmental allergies and persistent infections.        Problem List:    Patient Active Problem List   Diagnosis   • Acne vulgaris   • Lymphadenopathy, occipital   • Low HDL (under 40)       Medical History:    Past Medical History:   Diagnosis Date   • Acne vulgaris 03/03/2017    Face and back; primarily closed comedones    • COVID-19    • Left varicocele     s/p surgical correction in 2009   • Pilonidal cyst    • Situational anxiety     therapist, Claude Druitt. After break-up. Resolved.    • Strain of Achilles tendon 07/13/2017   • Sudden-onset sensorineural hearing loss 2019    L; saw ENT, s/p steroids   • Vegan diet 03/03/2017        Social History:    Social History     Socioeconomic History   • Marital status: Single   • Number of children: 0   Tobacco Use   • Smoking status: Never Smoker   • Smokeless tobacco: Never Used   Vaping Use   • Vaping Use: Never used   Substance and Sexual Activity   • Alcohol use: Yes     Comment: beer 4-6 weekly on weekends   • Drug use: Not Currently     Types: Marijuana     Comment: 1-2x/ week; smoke; quit in summer 2017   • Sexual activity: Yes     Partners: Female     Birth control/protection: Condom     Comment: one partner; no hx STD's       Family History:   Family History   Problem Relation Age of Onset   • Asthma Mother    • Hyperlipidemia Father    • Hypertension Father    • Depression Father    • Anxiety disorder Father    • Sleep apnea Father    • Hernia Father    • Depression Brother    • Anxiety disorder Brother    • Hyperlipidemia Maternal Grandmother    • Hypertension Maternal Grandmother    • Stroke Maternal Grandmother    • Heart attack Maternal Grandmother    • Breast cancer Maternal Grandmother    • Asthma Maternal Grandmother    • Heart attack Maternal Grandfather         51   • Other Paternal Grandmother         brain tumor   • Hypertension Paternal Grandmother    • Sleep apnea Paternal Grandmother    • Drug abuse Paternal Grandmother    • Osteoporosis Paternal Grandmother    • Alcohol abuse Paternal  Grandfather    • Hypertension Paternal Grandfather    • Liver cancer Paternal Grandfather    • Glaucoma Paternal Grandfather    • Heart attack Paternal Grandfather    • Other Paternal Grandfather         endarterectomy   • Leukemia Paternal Grandfather        Surgical History:   Past Surgical History:   Procedure Laterality Date   • PILONIDAL CYSTECTOMY  2014    no recurrence.    • VARICOCELE EXCISION Left 2009    due to pain and swelling, sx resolved post-op         Current Outpatient Medications:   •  cholecalciferol (VITAMIN D3) 1000 units tablet, Take 1,000 Units by mouth Daily., Disp: , Rfl:   •  hydrocortisone (Preparation H) 1 % cream, Apply  topically to the appropriate area as directed 2 (Two) Times a Day., Disp: , Rfl:   •  Protein powder, , Disp: , Rfl:   •  vitamin B-12 (CYANOCOBALAMIN) 1000 MCG tablet, Take 2 PO daily, Disp: 60 tablet, Rfl: 5    Vitals:    04/15/22 1506   BP: 120/60   Pulse: 81   Temp: 97.3 °F (36.3 °C)   SpO2: 95%     Body mass index is 27.39 kg/m².    Physical Exam  Vitals reviewed.   Constitutional:       General: He is not in acute distress.     Appearance: Normal appearance. He is well-developed. He is not ill-appearing or toxic-appearing.   HENT:      Head: Normocephalic and atraumatic.      Right Ear: Hearing, ear canal and external ear normal.      Left Ear: Hearing, ear canal and external ear normal.      Ears:      Comments: Cerumen noted B, not impacted, obscures TM B     Nose: Nose normal.      Mouth/Throat:      Mouth: Mucous membranes are moist. No oral lesions.      Pharynx: Oropharynx is clear. Uvula midline. No pharyngeal swelling, oropharyngeal exudate, posterior oropharyngeal erythema or uvula swelling.   Eyes:      General: Lids are normal. No scleral icterus.        Right eye: No discharge.         Left eye: No discharge.      Extraocular Movements: Extraocular movements intact.      Conjunctiva/sclera: Conjunctivae normal.      Pupils: Pupils are equal, round, and  reactive to light.   Neck:      Thyroid: No thyroid mass or thyromegaly.      Vascular: No carotid bruit.   Cardiovascular:      Rate and Rhythm: Normal rate and regular rhythm.      Pulses:           Radial pulses are 2+ on the right side and 2+ on the left side.        Dorsalis pedis pulses are 2+ on the right side and 2+ on the left side.        Posterior tibial pulses are 2+ on the right side and 2+ on the left side.      Heart sounds: Normal heart sounds, S1 normal and S2 normal. No murmur heard.    No friction rub. No gallop.   Pulmonary:      Effort: Pulmonary effort is normal. No respiratory distress.      Breath sounds: Normal breath sounds. No wheezing, rhonchi or rales.   Abdominal:      General: Bowel sounds are normal. There is no distension or abdominal bruit.      Palpations: Abdomen is soft. There is no mass or pulsatile mass.      Tenderness: There is no abdominal tenderness. There is no guarding or rebound.      Hernia: There is no hernia in the left inguinal area.   Genitourinary:     Penis: Normal. No discharge.       Testes: Normal.         Right: Mass not present.         Left: Mass not present.   Musculoskeletal:         General: Normal range of motion.      Cervical back: Full passive range of motion without pain, normal range of motion and neck supple. No rigidity. No muscular tenderness.      Right lower leg: No edema.      Left lower leg: No edema.   Lymphadenopathy:      Head:      Right side of head: Occipital (subcm mobile node noted on R, no TTP) adenopathy present.      Left side of head: No occipital adenopathy.      Cervical: No cervical adenopathy.      Lower Body: No right inguinal adenopathy. No left inguinal adenopathy.   Skin:     General: Skin is warm and dry.      Findings: No rash.   Neurological:      Mental Status: He is alert and oriented to person, place, and time.      Cranial Nerves: No cranial nerve deficit.      Sensory: No sensory deficit.      Motor: No weakness,  tremor, atrophy or abnormal muscle tone.      Gait: Gait normal.      Deep Tendon Reflexes: Reflexes are normal and symmetric.      Reflex Scores:       Patellar reflexes are 2+ on the right side and 2+ on the left side.       Achilles reflexes are 2+ on the right side and 2+ on the left side.  Psychiatric:         Attention and Perception: Attention normal.         Mood and Affect: Mood normal.         Speech: Speech normal.         Behavior: Behavior normal. Behavior is cooperative.         Thought Content: Thought content normal.         Assessment/ Plan  Diagnoses and all orders for this visit:    Healthcare maintenance    Acne vulgaris    Lymphadenopathy, occipital    Right carotid bruit  -     Duplex Carotid Ultrasound CAR; Future    Elevated liver enzymes  -     Hepatic Function Panel  -     Hepatitis Panel, Acute    Low HDL (under 40)        Return in about 1 year (around 4/15/2023) for Annual physical.      Discussion:  Homer PERSAUD Dashawn is a 26 y.o. male RTC In yearly CPE, review of medical issues:  1. Shotty LAD occipital region, node noted R inguinal (single node, subcm) - stable on exam, mobile. CBC OK.  Recall 4/2021 CT neck soft tissues with 'small lymph node posterior lateral to the right trapezius muscle at the C2 cervical level measures 9 x 5 x 12 mm.  Contralaterally at the same location is an 8 x 4 x 10 mm lymph node.  These are probably  benign reactive nodes and I think that they can be safely followed clinically'.  Will continue to monitor.  2. Cerumen, B - past impaction, not obstructed fully today.  Daily mineral oil drops to ears and shower water lavage again advised.   3. Sudden sensorineural hearing loss L - > 2 years out, s/p ENT with steroid oral tx. MAGALIS.   4. Acne, primarily closed comedones on back - stable, not bothersome to pt. Declines tx at this time.    5. R carotid bruit - noted on exam, cannot get to resolve with position change. Asx'ic.  Will get US to assess.    6. AST/ ALT  elevation - noted on labs, incidental. No related ETOH use.  Recheck today with hepatitis panel, additional w/u if progressive/ persistent.   7. Low HDL - improved on lab with increase in CV exercise, likely some genetic element baseline. Counseled on need for increase in CV exercise.   8. HM - labs d/w pt; Flu - next season, counseled; Tdap/ Hep A/ Hep B/ Menactra -UTD; Gardasil x2 only completed;  COVID vaccine strongly advised, counseled at   length again today; U G/C and HIV screen (-) 6/2019, no new partners and condom use consistent; c/w exercise and TLC diet.     RTC one year CPE, F labs prior

## 2022-04-16 LAB
ALBUMIN SERPL-MCNC: 5.1 G/DL (ref 4.1–5.2)
ALP SERPL-CCNC: 61 IU/L (ref 44–121)
ALT SERPL-CCNC: 33 IU/L (ref 0–44)
AST SERPL-CCNC: 30 IU/L (ref 0–40)
BILIRUB DIRECT SERPL-MCNC: 0.13 MG/DL (ref 0–0.4)
BILIRUB SERPL-MCNC: 0.4 MG/DL (ref 0–1.2)
HAV IGM SERPL QL IA: NEGATIVE
HBV CORE IGM SERPL QL IA: NEGATIVE
HBV SURFACE AG SERPL QL IA: NEGATIVE
HCV AB S/CO SERPL IA: 0.2 S/CO RATIO (ref 0–0.9)
HCV AB SERPL QL IA: NORMAL
PROT SERPL-MCNC: 7.5 G/DL (ref 6–8.5)

## 2022-07-01 ENCOUNTER — OFFICE VISIT (OUTPATIENT)
Dept: INTERNAL MEDICINE | Facility: CLINIC | Age: 27
End: 2022-07-01

## 2022-07-01 VITALS
HEART RATE: 68 BPM | TEMPERATURE: 96.9 F | HEIGHT: 76 IN | DIASTOLIC BLOOD PRESSURE: 80 MMHG | WEIGHT: 220 LBS | SYSTOLIC BLOOD PRESSURE: 122 MMHG | OXYGEN SATURATION: 98 % | BODY MASS INDEX: 26.79 KG/M2

## 2022-07-01 DIAGNOSIS — H61.21 IMPACTED CERUMEN OF RIGHT EAR: ICD-10-CM

## 2022-07-01 DIAGNOSIS — N36.8 IRRITATION OF URETHRAL MEATUS: Primary | ICD-10-CM

## 2022-07-01 DIAGNOSIS — R30.0 DYSURIA: ICD-10-CM

## 2022-07-01 LAB
BILIRUB BLD-MCNC: NEGATIVE MG/DL
CLARITY, POC: CLEAR
COLOR UR: YELLOW
EXPIRATION DATE: NORMAL
GLUCOSE UR STRIP-MCNC: NEGATIVE MG/DL
KETONES UR QL: NEGATIVE
LEUKOCYTE EST, POC: NEGATIVE
Lab: NORMAL
NITRITE UR-MCNC: NEGATIVE MG/ML
PH UR: 7 [PH] (ref 5–8)
PROT UR STRIP-MCNC: NEGATIVE MG/DL
RBC # UR STRIP: NEGATIVE /UL
SP GR UR: 1.01 (ref 1–1.03)
UROBILINOGEN UR QL: NORMAL

## 2022-07-01 PROCEDURE — 81003 URINALYSIS AUTO W/O SCOPE: CPT | Performed by: INTERNAL MEDICINE

## 2022-07-01 PROCEDURE — 99214 OFFICE O/P EST MOD 30 MIN: CPT | Performed by: INTERNAL MEDICINE

## 2022-07-01 RX ORDER — PRENATAL VIT 91/IRON/FOLIC/DHA 28-975-200
1 COMBINATION PACKAGE (EA) ORAL 2 TIMES DAILY
Qty: 20 G | Refills: 0
Start: 2022-07-01 | End: 2022-07-11

## 2022-07-01 NOTE — PROGRESS NOTES
Urinary Tract Infection (Burning after urination, abdominal pain)      NARINDER  Homer PERSAUD Willinger is a 26 y.o. male RTC in acute care:  Feels like over the last 5 years has noted intermittent (q few months) burning after urination, at tip of penis.  Will last one urination and 'next 20 mi nutes. Never sees blood in urine.    Had recurrent event more 'intense event' that was worse than usual.  Had some associated lower pelvic pressure that was new for pt. Has resolved as well.  Had a little urgency this AM when walking but resolved.   No penile discharge.  Currently sexually activity, not new partner.  Vaginal intercourse only.  NO concern for STD's.    No new products used.  Uses [place] bar soap.  Uses [place] [employer] lotion.   Uses compression shorts most days when working out, daily at work.     Asks to have ears cleaned today.  Has fullness and thinks they are plugged with wax.     Review of Systems   Constitutional: Negative for chills and fever.   HENT: Negative for ear discharge and ear pain (fullness. ).    Skin: Negative for rash and suspicious lesions.   Gastrointestinal: Negative for change in bowel habit.   Genitourinary: Positive for dysuria and urgency (one event this AM). Negative for genital sores, hematuria, hesitancy and pelvic pain.       The following portions of the patient's history were reviewed and updated as appropriate: allergies, current medications, past medical history, past social history and problem list.      Current Outpatient Medications:   •  cholecalciferol (VITAMIN D3) 1000 units tablet, Take 1,000 Units by mouth Daily., Disp: , Rfl:   •  hydrocortisone (Preparation H) 1 % cream, Apply  topically to the appropriate area as directed 2 (Two) Times a Day., Disp: , Rfl:   •  Protein powder, , Disp: , Rfl:   •  vitamin B-12 (CYANOCOBALAMIN) 1000 MCG tablet, Take 2 PO daily, Disp: 60 tablet, Rfl: 5  •  terbinafine (LamISIL AT) 1 % cream, Apply 1 application topically to the appropriate area as  "directed 2 (Two) Times a Day for 10 days., Disp: 20 g, Rfl: 0    Vitals:    07/01/22 1121   BP: 122/80   Pulse: 68   Temp: 96.9 °F (36.1 °C)   SpO2: 98%   Weight: 99.8 kg (220 lb)   Height: 193 cm (76\")     Body mass index is 26.78 kg/m².      Physical Exam  Vitals reviewed.   Constitutional:       General: He is not in acute distress.     Appearance: He is well-developed. He is not ill-appearing or toxic-appearing.   HENT:      Head: Normocephalic.      Right Ear: External ear normal. There is impacted cerumen.      Left Ear: Tympanic membrane, ear canal and external ear normal.   Pulmonary:      Effort: Pulmonary effort is normal. No respiratory distress.   Abdominal:      Hernia: There is no hernia in the left inguinal area or right inguinal area.   Genitourinary:     Pubic Area: No rash.       Penis: Circumcised. Erythema (very small area just at posterior border of urethral opening, macular, no debris or scaling noted.  Faint. ) present. No tenderness or swelling.       Testes: Normal.         Right: Mass, tenderness or swelling not present.         Left: Mass, tenderness or swelling not present.      Epididymis:      Right: Normal.      Left: Normal.   Lymphadenopathy:      Lower Body: No right inguinal adenopathy. No left inguinal adenopathy.   Neurological:      Mental Status: He is alert and oriented to person, place, and time.   Psychiatric:         Behavior: Behavior normal.         Thought Content: Thought content normal.         Results for orders placed or performed in visit on 07/01/22   POC Urinalysis Dipstick, Automated    Specimen: Urine   Result Value Ref Range    Color Yellow Yellow, Straw, Dark Yellow, Eliana    Clarity, UA Clear Clear    Specific Gravity  1.010 1.005 - 1.030    pH, Urine 7.0 5.0 - 8.0    Leukocytes Negative Negative    Nitrite, UA Negative Negative    Protein, POC Negative Negative mg/dL    Glucose, UA Negative Negative mg/dL    Ketones, UA Negative Negative    Urobilinogen, UA " Normal Normal    Bilirubin Negative Negative    Blood, UA Negative Negative    Lot Number [id number]     Expiration Date 53,123        Assessment/ Plan  Diagnoses and all orders for this visit:    Irritation of urethral meatus  -     terbinafine (LamISIL AT) 1 % cream; Apply 1 application topically to the appropriate area as directed 2 (Two) Times a Day for 10 days.  -     Chlamydia trachomatis, Neisseria gonorrhoeae, PCR - , Urine, Clean Catch    Dysuria  -     POC Urinalysis Dipstick, Automated  -     terbinafine (LamISIL AT) 1 % cream; Apply 1 application topically to the appropriate area as directed 2 (Two) Times a Day for 10 days.  -     Chlamydia trachomatis, Neisseria gonorrhoeae, PCR - , Urine, Clean Catch    Impacted cerumen of right ear  -     Cancel: Chlamydia trachomatis, Neisseria gonorrhoeae, PCR - , Urine, Clean Catch        Return for Next scheduled follow up.      Discussion:  Homer PESRAUD Elizabethdonny is a 26 y.o. male RTC in acute care:  1. Urethral meatus irritation, intermittent single urination dysuria events - noted over last 5 years. Pronounced event yesterday with single urination some pelvic pressure associated.  Udip clear in office.  Low risk sexual hx, single female partner. Will check UG/C today.  Exam reveals only very faint erythema macular area at inferior border of urethral meatus.  I am very suspicios pt sx are from either soap irritation or from compression short (wears daily to work as ) friction and irritation.  Less suspicious of fungal element. Will have pt hold compression short use for a few days. Start OTC antifungal cream to cover fungal elements but also for element of lubrication/ friction reduction at urethral meatus.  Reassured pt overall.  RTC or call if not better or progressive/ recurrent sx.  Cautioned potential for recurrence with compression short use over long term.   2. Cerumen impaction, L - doorknob mention today. Water lavage completed by MA in office today.  Cerumen impaction relieved per MA.     RTC as planned.

## 2023-04-07 DIAGNOSIS — Z00.00 ANNUAL PHYSICAL EXAM: Primary | ICD-10-CM

## 2023-04-15 LAB
ALBUMIN SERPL-MCNC: 5 G/DL (ref 3.5–5.2)
ALBUMIN/CREAT UR: <16 MG/G CREAT (ref 0–29)
ALBUMIN/GLOB SERPL: 1.9 G/DL
ALP SERPL-CCNC: 54 U/L (ref 39–117)
ALT SERPL-CCNC: 27 U/L (ref 1–41)
APPEARANCE UR: CLEAR
AST SERPL-CCNC: 28 U/L (ref 1–40)
BACTERIA #/AREA URNS HPF: NORMAL /HPF
BASOPHILS # BLD AUTO: 0.03 10*3/MM3 (ref 0–0.2)
BASOPHILS NFR BLD AUTO: 0.6 % (ref 0–1.5)
BILIRUB SERPL-MCNC: 0.8 MG/DL (ref 0–1.2)
BILIRUB UR QL STRIP: NEGATIVE
BUN SERPL-MCNC: 19 MG/DL (ref 6–20)
BUN/CREAT SERPL: 17.1 (ref 7–25)
CALCIUM SERPL-MCNC: 10.1 MG/DL (ref 8.6–10.5)
CASTS URNS QL MICRO: NORMAL /LPF
CHLORIDE SERPL-SCNC: 101 MMOL/L (ref 98–107)
CHOLEST SERPL-MCNC: 171 MG/DL (ref 0–200)
CO2 SERPL-SCNC: 27.7 MMOL/L (ref 22–29)
COLOR UR: YELLOW
CREAT SERPL-MCNC: 1.11 MG/DL (ref 0.76–1.27)
CREAT UR-MCNC: 19.3 MG/DL
EGFRCR SERPLBLD CKD-EPI 2021: 93.9 ML/MIN/1.73
EOSINOPHIL # BLD AUTO: 0.1 10*3/MM3 (ref 0–0.4)
EOSINOPHIL NFR BLD AUTO: 1.9 % (ref 0.3–6.2)
EPI CELLS #/AREA URNS HPF: NORMAL /HPF (ref 0–10)
ERYTHROCYTE [DISTWIDTH] IN BLOOD BY AUTOMATED COUNT: 12.2 % (ref 12.3–15.4)
GLOBULIN SER CALC-MCNC: 2.6 GM/DL
GLUCOSE SERPL-MCNC: 98 MG/DL (ref 65–99)
GLUCOSE UR QL STRIP: NEGATIVE
HBA1C MFR BLD: 5 % (ref 4.8–5.6)
HCT VFR BLD AUTO: 45.1 % (ref 37.5–51)
HDLC SERPL-MCNC: 41 MG/DL (ref 40–60)
HGB BLD-MCNC: 15.6 G/DL (ref 13–17.7)
HGB UR QL STRIP: NEGATIVE
IMM GRANULOCYTES # BLD AUTO: 0.01 10*3/MM3 (ref 0–0.05)
IMM GRANULOCYTES NFR BLD AUTO: 0.2 % (ref 0–0.5)
KETONES UR QL STRIP: NEGATIVE
LDLC SERPL CALC-MCNC: 117 MG/DL (ref 0–100)
LEUKOCYTE ESTERASE UR QL STRIP: NEGATIVE
LYMPHOCYTES # BLD AUTO: 1.59 10*3/MM3 (ref 0.7–3.1)
LYMPHOCYTES NFR BLD AUTO: 30.1 % (ref 19.6–45.3)
MCH RBC QN AUTO: 31.9 PG (ref 26.6–33)
MCHC RBC AUTO-ENTMCNC: 34.6 G/DL (ref 31.5–35.7)
MCV RBC AUTO: 92.2 FL (ref 79–97)
MICRO URNS: NORMAL
MICRO URNS: NORMAL
MICROALBUMIN UR-MCNC: <3 UG/ML
MONOCYTES # BLD AUTO: 0.5 10*3/MM3 (ref 0.1–0.9)
MONOCYTES NFR BLD AUTO: 9.5 % (ref 5–12)
NEUTROPHILS # BLD AUTO: 3.05 10*3/MM3 (ref 1.7–7)
NEUTROPHILS NFR BLD AUTO: 57.7 % (ref 42.7–76)
NITRITE UR QL STRIP: NEGATIVE
NRBC BLD AUTO-RTO: 0 /100 WBC (ref 0–0.2)
PH UR STRIP: 7 [PH] (ref 5–7.5)
PLATELET # BLD AUTO: 245 10*3/MM3 (ref 140–450)
POTASSIUM SERPL-SCNC: 4.1 MMOL/L (ref 3.5–5.2)
PROT SERPL-MCNC: 7.6 G/DL (ref 6–8.5)
PROT UR QL STRIP: NEGATIVE
RBC # BLD AUTO: 4.89 10*6/MM3 (ref 4.14–5.8)
RBC #/AREA URNS HPF: NORMAL /HPF (ref 0–2)
SODIUM SERPL-SCNC: 138 MMOL/L (ref 136–145)
SP GR UR STRIP: 1 (ref 1–1.03)
TRIGL SERPL-MCNC: 67 MG/DL (ref 0–150)
TSH SERPL DL<=0.005 MIU/L-ACNC: 2.04 UIU/ML (ref 0.27–4.2)
URINALYSIS REFLEX: NORMAL
UROBILINOGEN UR STRIP-MCNC: 0.2 MG/DL (ref 0.2–1)
VLDLC SERPL CALC-MCNC: 13 MG/DL (ref 5–40)
WBC # BLD AUTO: 5.28 10*3/MM3 (ref 3.4–10.8)
WBC #/AREA URNS HPF: NORMAL /HPF (ref 0–5)

## 2023-04-21 ENCOUNTER — OFFICE VISIT (OUTPATIENT)
Dept: INTERNAL MEDICINE | Facility: CLINIC | Age: 28
End: 2023-04-21
Payer: COMMERCIAL

## 2023-04-21 VITALS
OXYGEN SATURATION: 98 % | WEIGHT: 233 LBS | BODY MASS INDEX: 28.37 KG/M2 | HEART RATE: 69 BPM | SYSTOLIC BLOOD PRESSURE: 122 MMHG | HEIGHT: 76 IN | DIASTOLIC BLOOD PRESSURE: 78 MMHG | RESPIRATION RATE: 16 BRPM

## 2023-04-21 DIAGNOSIS — Z00.01 ENCOUNTER FOR GENERAL ADULT MEDICAL EXAMINATION WITH ABNORMAL FINDINGS: Primary | ICD-10-CM

## 2023-04-21 DIAGNOSIS — E78.6 LOW HDL (UNDER 40): ICD-10-CM

## 2023-04-21 DIAGNOSIS — L70.0 ACNE VULGARIS: ICD-10-CM

## 2023-04-21 DIAGNOSIS — R59.0 LYMPHADENOPATHY, OCCIPITAL: ICD-10-CM

## 2023-04-21 DIAGNOSIS — H61.23 BILATERAL IMPACTED CERUMEN: ICD-10-CM

## 2023-04-21 DIAGNOSIS — R09.89 RIGHT CAROTID BRUIT: ICD-10-CM

## 2023-04-21 NOTE — PROGRESS NOTES
No chief complaint on file.      HPI  Homer PERSAUD Willinger is a 27 y.o. male RTC in yearly CPE, review of medical issues:  1. Shotty LAD occipital region, node noted R inguinal (single node, subcm) - stable on prior exam, mobile.  Pt notes he has not noted any changes at all. NO LAD noted.    2. Cerumen, B - past impaction, recurred again today.   Has not been using anything at [facility]. Tried olive oil, 'did not stick with it.   3. Acne, primarily closed comedones on back - stable, not bothersome to pt. No change over year.   4. Low HDL - improved on prior labs, pt has been consistent with CV exercise.     Review of Systems   Constitutional: Positive for weight gain (in winter; intentional). Negative for chills, fever, malaise/fatigue and weight loss.   HENT: Negative for congestion, ear discharge, ear pain, hearing loss ('feels clogged' with cerumen), odynophagia and sore throat.    Eyes: Negative for discharge, double vision, pain and redness.        Last eye exam ~2021; no glasses     Cardiovascular: Negative for chest pain, dyspnea on exertion, irregular heartbeat, leg swelling, near-syncope, palpitations and syncope.   Respiratory: Negative for cough and shortness of breath.    Endocrine: Negative for polydipsia, polyphagia and polyuria.   Hematologic/Lymphatic: Negative for bleeding problem. Does not bruise/bleed easily.   Skin: Negative for rash and suspicious lesions.   Musculoskeletal: Negative for joint pain, joint swelling, muscle cramps, muscle weakness and myalgias.   Gastrointestinal: Negative for constipation, diarrhea, dysphagia, heartburn, nausea and vomiting.   Genitourinary: Negative for dysuria, frequency, genital sores, hematuria, hesitancy and incomplete emptying.   Neurological: Negative for dizziness, headaches and light-headedness.   Psychiatric/Behavioral: Negative for depression. The patient does not have insomnia and is not nervous/anxious.    Allergic/Immunologic: Negative for environmental  allergies and persistent infections.       Problem List:    Patient Active Problem List   Diagnosis   • Acne vulgaris   • Lymphadenopathy, occipital   • Low HDL (under 40)       Medical History:    Past Medical History:   Diagnosis Date   • Acne vulgaris 03/03/2017    Face and back; primarily closed comedones    • COVID-19    • Left varicocele     s/p surgical correction in 2009   • Pilonidal cyst    • Situational anxiety     therapist, Claude Druitt. After break-up. Resolved.    • Strain of Achilles tendon 07/13/2017   • Sudden-onset sensorineural hearing loss 2019    L; saw ENT, s/p steroids   • Vegan diet 03/03/2017        Social History:    Social History     Socioeconomic History   • Marital status: Single   • Number of children: 0   Tobacco Use   • Smoking status: Never   • Smokeless tobacco: Never   Vaping Use   • Vaping Use: Never used   Substance and Sexual Activity   • Alcohol use: Yes     Comment: beer 4-6 weekly on weekends   • Drug use: Not Currently     Types: Marijuana     Comment: 1-2x/ week; smoke; quit in summer 2017   • Sexual activity: Yes     Partners: Female     Birth control/protection: Condom     Comment: one partner; one new partner in 2023; no hx STD's       Family History:   Family History   Problem Relation Age of Onset   • Asthma Mother    • Hyperlipidemia Father    • Hypertension Father    • Depression Father    • Anxiety disorder Father    • Sleep apnea Father    • Hernia Father    • Depression Brother    • Anxiety disorder Brother    • Hyperlipidemia Maternal Grandmother    • Hypertension Maternal Grandmother    • Stroke Maternal Grandmother    • Heart attack Maternal Grandmother    • Breast cancer Maternal Grandmother    • Asthma Maternal Grandmother    • Heart attack Maternal Grandfather         51   • Other Paternal Grandmother         brain tumor   • Hypertension Paternal Grandmother    • Sleep apnea Paternal Grandmother    • Drug abuse Paternal Grandmother    • Osteoporosis  Paternal Grandmother    • Alcohol abuse Paternal Grandfather    • Hypertension Paternal Grandfather    • Liver cancer Paternal Grandfather    • Glaucoma Paternal Grandfather    • Heart attack Paternal Grandfather    • Other Paternal Grandfather         endarterectomy   • Leukemia Paternal Grandfather        Surgical History:   Past Surgical History:   Procedure Laterality Date   • PILONIDAL CYSTECTOMY  2014    no recurrence.    • VARICOCELE EXCISION Left 2009    due to pain and swelling, sx resolved post-op         Current Outpatient Medications:   •  Protein powder, , Disp: , Rfl:   •  cholecalciferol (VITAMIN D3) 1000 units tablet, Take 1,000 Units by mouth Daily., Disp: , Rfl:   •  hydrocortisone (Preparation H) 1 % cream, Apply  topically to the appropriate area as directed 2 (Two) Times a Day., Disp: , Rfl:   •  vitamin B-12 (CYANOCOBALAMIN) 1000 MCG tablet, Take 2 PO daily, Disp: 60 tablet, Rfl: 5    Vitals:    04/21/23 1444   BP: 122/78   Pulse: 69   Resp: 16   SpO2: 98%     Body mass index is 28.36 kg/m².    Physical Exam  Vitals reviewed.   Constitutional:       General: He is not in acute distress.     Appearance: Normal appearance. He is well-developed. He is not ill-appearing or toxic-appearing.      Comments: Muscular habitus     HENT:      Head: Normocephalic and atraumatic.      Right Ear: Hearing, tympanic membrane, ear canal and external ear normal. There is impacted cerumen.      Left Ear: Hearing, tympanic membrane, ear canal and external ear normal. There is impacted cerumen.      Ears:      Comments: Exam benign/ non-focal after B cerumen lavaged to clear.      Nose: Nose normal.      Mouth/Throat:      Mouth: Mucous membranes are moist. No oral lesions.      Pharynx: Oropharynx is clear. Uvula midline. No pharyngeal swelling, oropharyngeal exudate, posterior oropharyngeal erythema or uvula swelling.   Eyes:      General: Lids are normal. No scleral icterus.        Right eye: No discharge.          Left eye: No discharge.      Extraocular Movements: Extraocular movements intact.      Conjunctiva/sclera: Conjunctivae normal.      Pupils: Pupils are equal, round, and reactive to light.   Neck:      Thyroid: No thyroid mass or thyromegaly.      Vascular: No carotid bruit.   Cardiovascular:      Rate and Rhythm: Normal rate and regular rhythm.      Pulses:           Radial pulses are 2+ on the right side and 2+ on the left side.        Dorsalis pedis pulses are 2+ on the right side and 2+ on the left side.        Posterior tibial pulses are 2+ on the right side and 2+ on the left side.      Heart sounds: Normal heart sounds, S1 normal and S2 normal. No murmur heard.    No friction rub. No gallop.   Pulmonary:      Effort: Pulmonary effort is normal. No respiratory distress.      Breath sounds: Normal breath sounds. No wheezing, rhonchi or rales.   Abdominal:      General: Bowel sounds are normal. There is no distension.      Palpations: Abdomen is soft. There is no mass.      Tenderness: There is no abdominal tenderness. There is no guarding or rebound.      Hernia: There is no hernia in the left inguinal area or right inguinal area.   Genitourinary:     Penis: Normal. No discharge.       Testes: Normal.         Right: Mass not present.         Left: Mass not present.   Musculoskeletal:         General: Normal range of motion.      Cervical back: Full passive range of motion without pain, normal range of motion and neck supple. No rigidity. No muscular tenderness.      Right lower leg: No edema.      Left lower leg: No edema.   Lymphadenopathy:      Cervical: No cervical adenopathy.      Lower Body: No right inguinal adenopathy. No left inguinal adenopathy.   Skin:     General: Skin is warm and dry.      Findings: Acne (closed comedones across back) present. No rash.   Neurological:      Mental Status: He is alert and oriented to person, place, and time.      Cranial Nerves: No cranial nerve deficit.       Sensory: No sensory deficit.      Motor: No weakness, tremor, atrophy or abnormal muscle tone.      Gait: Gait normal.      Deep Tendon Reflexes: Reflexes are normal and symmetric.      Reflex Scores:       Patellar reflexes are 2+ on the right side and 2+ on the left side.       Achilles reflexes are 2+ on the right side and 2+ on the left side.  Psychiatric:         Attention and Perception: Attention normal.         Mood and Affect: Mood normal.         Speech: Speech normal.         Behavior: Behavior normal. Behavior is cooperative.         Thought Content: Thought content normal.         Assessment/ Plan  Diagnoses and all orders for this visit:    Encounter for general adult medical examination with abnormal findings    Acne vulgaris    Lymphadenopathy, occipital    Low HDL (under 40)    Right carotid bruit  -     US Carotid Bilateral; Future    Bilateral impacted cerumen        Return in about 1 year (around 4/21/2024) for Annual physical.      Discussion:  Homer Tamez is a 27 y.o. male RTC in yearly CPE, review of medical issues:  1. Shotty LAD occipital region - improved on exam, mobile. CBC OK.  Recall 4/2021 CT neck soft tissues with 'small lymph node posterior lateral to the right trapezius muscle at the C2 cervical level measures 9 x 5 x 12 mm.  Contralaterally at the same location is an 8 x 4 x 10 mm lymph node.  These are probably  benign reactive nodes and I think that they can be safely followed clinically'.  Reassured pt today, will monitor for now.   2. Cerumen, B - recurrent impaction, recurred again today. Lavage in office to clear by MARLON MILLER's OK on exam thereafter.  Advised daily mineral or olive oil drops to ears prior to shower.   3. Sudden sensorineural hearing loss L - > 3 years out, s/p ENT with steroid oral tx. MAGALIS.   4. Acne, primarily closed comedones on back - stable, not bothersome to pt. Declines tx at this time.    5. R carotid bruit - persists, noted on exam, cannot get to  resolve with position change. Asx'ic. US carotids to assess.    6. Low HDL - Baseline 26; Improved in 2022 to 38 with increase in CV exercise, further improved to 41 in 2023. Likely some genetic element baseline. Counseled on need for increase in CV exercise.   7. HM - labs d/w pt; Flu - next season, counseled; Tdap/ Hep A/ Hep B/ Menactra -UTD; Gardasil x2 only completed;  COVID vaccine strongly advised; U G/C and HIV screen (-) 6/2019, condom use consistent; c/w exercise and TLC diet.     RTC one year CPE, F labs prior

## 2024-04-19 DIAGNOSIS — E78.6 LOW HDL (UNDER 40): ICD-10-CM

## 2024-04-19 DIAGNOSIS — Z00.00 HEALTHCARE MAINTENANCE: Primary | ICD-10-CM

## 2024-04-20 LAB
ALBUMIN SERPL-MCNC: 4.9 G/DL (ref 3.5–5.2)
ALBUMIN/GLOB SERPL: 2 G/DL
ALP SERPL-CCNC: 53 U/L (ref 39–117)
ALT SERPL-CCNC: 22 U/L (ref 1–41)
APPEARANCE UR: CLEAR
AST SERPL-CCNC: 25 U/L (ref 1–40)
BACTERIA #/AREA URNS HPF: NORMAL /[HPF]
BASOPHILS # BLD AUTO: 0.03 10*3/MM3 (ref 0–0.2)
BASOPHILS NFR BLD AUTO: 0.7 % (ref 0–1.5)
BILIRUB SERPL-MCNC: 0.7 MG/DL (ref 0–1.2)
BILIRUB UR QL STRIP: NEGATIVE
BUN SERPL-MCNC: 21 MG/DL (ref 6–20)
BUN/CREAT SERPL: 19.3 (ref 7–25)
CALCIUM SERPL-MCNC: 9.7 MG/DL (ref 8.6–10.5)
CASTS URNS QL MICRO: NORMAL /LPF
CHLORIDE SERPL-SCNC: 100 MMOL/L (ref 98–107)
CHOLEST SERPL-MCNC: 166 MG/DL (ref 0–200)
CO2 SERPL-SCNC: 26.9 MMOL/L (ref 22–29)
COLOR UR: YELLOW
CREAT SERPL-MCNC: 1.09 MG/DL (ref 0.76–1.27)
EGFRCR SERPLBLD CKD-EPI 2021: 95.4 ML/MIN/1.73
EOSINOPHIL # BLD AUTO: 0.08 10*3/MM3 (ref 0–0.4)
EOSINOPHIL NFR BLD AUTO: 1.9 % (ref 0.3–6.2)
EPI CELLS #/AREA URNS HPF: NORMAL /HPF (ref 0–10)
ERYTHROCYTE [DISTWIDTH] IN BLOOD BY AUTOMATED COUNT: 12.3 % (ref 12.3–15.4)
GLOBULIN SER CALC-MCNC: 2.4 GM/DL
GLUCOSE SERPL-MCNC: 83 MG/DL (ref 65–99)
GLUCOSE UR QL STRIP: NEGATIVE
HBA1C MFR BLD: 4.9 % (ref 4.8–5.6)
HCT VFR BLD AUTO: 45 % (ref 37.5–51)
HDLC SERPL-MCNC: 41 MG/DL (ref 40–60)
HGB BLD-MCNC: 15.2 G/DL (ref 13–17.7)
HGB UR QL STRIP: NEGATIVE
IMM GRANULOCYTES # BLD AUTO: 0.01 10*3/MM3 (ref 0–0.05)
IMM GRANULOCYTES NFR BLD AUTO: 0.2 % (ref 0–0.5)
KETONES UR QL STRIP: NEGATIVE
LDLC SERPL CALC-MCNC: 112 MG/DL (ref 0–100)
LDLC/HDLC SERPL: 2.73 {RATIO}
LEUKOCYTE ESTERASE UR QL STRIP: NEGATIVE
LYMPHOCYTES # BLD AUTO: 1.71 10*3/MM3 (ref 0.7–3.1)
LYMPHOCYTES NFR BLD AUTO: 40 % (ref 19.6–45.3)
MCH RBC QN AUTO: 31.2 PG (ref 26.6–33)
MCHC RBC AUTO-ENTMCNC: 33.8 G/DL (ref 31.5–35.7)
MCV RBC AUTO: 92.4 FL (ref 79–97)
MICRO URNS: NORMAL
MICRO URNS: NORMAL
MONOCYTES # BLD AUTO: 0.38 10*3/MM3 (ref 0.1–0.9)
MONOCYTES NFR BLD AUTO: 8.9 % (ref 5–12)
NEUTROPHILS # BLD AUTO: 2.06 10*3/MM3 (ref 1.7–7)
NEUTROPHILS NFR BLD AUTO: 48.3 % (ref 42.7–76)
NITRITE UR QL STRIP: NEGATIVE
NRBC BLD AUTO-RTO: 0 /100 WBC (ref 0–0.2)
PH UR STRIP: 6.5 [PH] (ref 5–7.5)
PLATELET # BLD AUTO: 247 10*3/MM3 (ref 140–450)
POTASSIUM SERPL-SCNC: 4.7 MMOL/L (ref 3.5–5.2)
PROT SERPL-MCNC: 7.3 G/DL (ref 6–8.5)
PROT UR QL STRIP: NEGATIVE
RBC # BLD AUTO: 4.87 10*6/MM3 (ref 4.14–5.8)
RBC #/AREA URNS HPF: NORMAL /HPF (ref 0–2)
SODIUM SERPL-SCNC: 138 MMOL/L (ref 136–145)
SP GR UR STRIP: 1.01 (ref 1–1.03)
TRIGL SERPL-MCNC: 66 MG/DL (ref 0–150)
TSH SERPL DL<=0.005 MIU/L-ACNC: 2.05 UIU/ML (ref 0.27–4.2)
URINALYSIS REFLEX: NORMAL
UROBILINOGEN UR STRIP-MCNC: 0.2 MG/DL (ref 0.2–1)
VLDLC SERPL CALC-MCNC: 13 MG/DL (ref 5–40)
WBC # BLD AUTO: 4.27 10*3/MM3 (ref 3.4–10.8)
WBC #/AREA URNS HPF: NORMAL /HPF (ref 0–5)

## 2024-04-26 ENCOUNTER — OFFICE VISIT (OUTPATIENT)
Dept: INTERNAL MEDICINE | Facility: CLINIC | Age: 29
End: 2024-04-26
Payer: COMMERCIAL

## 2024-04-26 VITALS
BODY MASS INDEX: 27.43 KG/M2 | HEIGHT: 76 IN | DIASTOLIC BLOOD PRESSURE: 68 MMHG | SYSTOLIC BLOOD PRESSURE: 112 MMHG | OXYGEN SATURATION: 99 % | HEART RATE: 65 BPM | TEMPERATURE: 98.7 F | WEIGHT: 225.3 LBS

## 2024-04-26 DIAGNOSIS — L70.0 ACNE VULGARIS: ICD-10-CM

## 2024-04-26 DIAGNOSIS — R09.89 RIGHT CAROTID BRUIT: Primary | ICD-10-CM

## 2024-04-26 DIAGNOSIS — R59.0 LYMPHADENOPATHY, OCCIPITAL: ICD-10-CM

## 2024-04-26 DIAGNOSIS — E78.6 LOW HDL (UNDER 40): ICD-10-CM

## 2024-04-26 DIAGNOSIS — Z00.01 ENCOUNTER FOR GENERAL ADULT MEDICAL EXAMINATION WITH ABNORMAL FINDINGS: ICD-10-CM

## 2024-04-26 NOTE — PROGRESS NOTES
"Annual Exam      HPI  Homer Tamez is a 28 y.o. male RTC in yearly CPE, review of medical issues:   1. Shotty LAD occipital region - improved on exam, mobile. \"I have not felt any difference'.  Weight has been stable. No fevers.   2. Sudden sensorineural hearing loss L - > 3 years out, s/p ENT with steroid oral tx. Fully resolved.  Rarely gets ringing in ear.   3. Low HDL;  Baseline 26; Improved in 2022 to 38 with increase in CV exercise, further improved to 41 in 2023 -has continue with CV exercise on a regular basis.   4.  Carotid bruit, R -noted in past, patient aware.  Has not gotten US scheduled as \"every time they call, they will voice message and I do not call and get it scheduled.\"  Willing to complete.    Review of Systems   Constitutional: Negative for chills, fever, malaise/fatigue, weight gain and weight loss.   HENT:  Positive for tinnitus (Occasional, intermittent). Negative for congestion, hearing loss, odynophagia and sore throat.    Eyes:  Negative for discharge, double vision, pain and redness.   Cardiovascular:  Negative for chest pain, dyspnea on exertion, irregular heartbeat, leg swelling, near-syncope, palpitations and syncope.   Respiratory:  Negative for cough and shortness of breath.    Endocrine: Negative for polydipsia, polyphagia and polyuria.   Hematologic/Lymphatic: Negative for adenopathy and bleeding problem. Does not bruise/bleed easily.   Skin:  Negative for rash and suspicious lesions.   Musculoskeletal:  Negative for joint pain, joint swelling, muscle cramps, muscle weakness and myalgias.   Gastrointestinal:  Negative for constipation, diarrhea, dysphagia, heartburn, nausea and vomiting.   Genitourinary:  Negative for dysuria, frequency, genital sores, hematuria, hesitancy and incomplete emptying.   Neurological:  Negative for dizziness, headaches and light-headedness.   Psychiatric/Behavioral:  Negative for depression. The patient does not have insomnia and is not " nervous/anxious.    Allergic/Immunologic: Negative for environmental allergies and persistent infections.       Problem List:    Patient Active Problem List   Diagnosis    Acne vulgaris    Lymphadenopathy, occipital    Low HDL (under 40)       Medical History:    Past Medical History:   Diagnosis Date    Acne vulgaris 03/03/2017    Face and back; primarily closed comedones     COVID-19     Left varicocele     s/p surgical correction in 2009    Pilonidal cyst     Situational anxiety     therapist, Claude Druitt. After break-up. Resolved.     Strain of Achilles tendon 07/13/2017    Sudden-onset sensorineural hearing loss 2019    L; saw ENT, s/p steroids    Vegan diet 03/03/2017        Social History:    Social History     Socioeconomic History    Marital status: Single    Number of children: 0   Tobacco Use    Smoking status: Never    Smokeless tobacco: Never    Tobacco comments:     Cigars once monthly   Vaping Use    Vaping status: Never Used   Substance and Sexual Activity    Alcohol use: Yes     Comment: beer 4-6 weekly on weekends    Drug use: Not Currently     Types: Marijuana     Comment: 1-2x/ week; smoke; quit in summer 2017    Sexual activity: Yes     Partners: Female     Birth control/protection: Condom     Comment: one partner; one new partner in 2023; no hx STD's       Family History:   Family History   Problem Relation Age of Onset    Asthma Mother     Hyperlipidemia Father     Hypertension Father     Depression Father     Anxiety disorder Father     Sleep apnea Father     Hernia Father     Depression Brother     Anxiety disorder Brother     Hyperlipidemia Maternal Grandmother     Hypertension Maternal Grandmother     Stroke Maternal Grandmother     Heart attack Maternal Grandmother     Breast cancer Maternal Grandmother     Asthma Maternal Grandmother     Heart attack Maternal Grandfather         51    Other Paternal Grandmother         brain tumor    Hypertension Paternal Grandmother     Sleep apnea  Paternal Grandmother     Drug abuse Paternal Grandmother     Osteoporosis Paternal Grandmother     Alcohol abuse Paternal Grandfather     Hypertension Paternal Grandfather     Liver cancer Paternal Grandfather     Glaucoma Paternal Grandfather     Heart attack Paternal Grandfather     Other Paternal Grandfather         endarterectomy    Leukemia Paternal Grandfather        Surgical History:   Past Surgical History:   Procedure Laterality Date    PILONIDAL CYSTECTOMY  2014    no recurrence.     VARICOCELE EXCISION Left 2009    due to pain and swelling, sx resolved post-op         Current Outpatient Medications:     Protein powder, , Disp: , Rfl:     Vitals:    04/26/24 1506   BP: 112/68   Pulse: 65   Temp: 98.7 °F (37.1 °C)   SpO2: 99%     Body mass index is 27.44 kg/m².    Physical Exam  Vitals reviewed.   Constitutional:       General: He is not in acute distress.     Appearance: Normal appearance. He is well-developed. He is not ill-appearing or toxic-appearing.   HENT:      Head: Normocephalic and atraumatic.      Right Ear: Hearing and external ear normal. There is no impacted cerumen (Cerumen obscures TM on R, not impacted).      Left Ear: Hearing, tympanic membrane, ear canal and external ear normal. There is no impacted cerumen.      Nose: Nose normal.      Mouth/Throat:      Mouth: Mucous membranes are moist. No oral lesions.      Pharynx: Oropharynx is clear. Uvula midline. No pharyngeal swelling, oropharyngeal exudate, posterior oropharyngeal erythema or uvula swelling.   Eyes:      General: Lids are normal. No scleral icterus.        Right eye: No discharge.         Left eye: No discharge.      Extraocular Movements: Extraocular movements intact.      Conjunctiva/sclera: Conjunctivae normal.      Pupils: Pupils are equal, round, and reactive to light.   Neck:      Thyroid: No thyroid mass or thyromegaly.      Vascular: Carotid bruit (on R) present.   Cardiovascular:      Rate and Rhythm: Normal rate and  regular rhythm.      Pulses:           Radial pulses are 2+ on the right side and 2+ on the left side.        Dorsalis pedis pulses are 2+ on the right side and 2+ on the left side.        Posterior tibial pulses are 2+ on the right side and 2+ on the left side.      Heart sounds: Normal heart sounds, S1 normal and S2 normal. No murmur heard.     No friction rub. No gallop.   Pulmonary:      Effort: Pulmonary effort is normal. No respiratory distress.      Breath sounds: Normal breath sounds. No wheezing, rhonchi or rales.   Abdominal:      General: Bowel sounds are normal. There is no distension.      Palpations: Abdomen is soft. There is no mass.      Tenderness: There is no abdominal tenderness. There is no guarding or rebound.      Hernia: There is no hernia in the left inguinal area or right inguinal area.   Genitourinary:     Penis: Normal. No discharge.       Testes: Normal.         Right: Mass, tenderness or swelling not present.         Left: Mass, tenderness or swelling not present.      Epididymis:      Right: Normal.      Left: Normal.   Musculoskeletal:         General: Normal range of motion.      Right shoulder: No tenderness, bony tenderness or crepitus. Normal range of motion.      Left shoulder: No tenderness, bony tenderness or crepitus. Normal range of motion.      Right hand: No tenderness or bony tenderness. Normal range of motion. Normal strength.      Left hand: No tenderness or bony tenderness. Normal range of motion. Normal strength.      Cervical back: Full passive range of motion without pain, normal range of motion and neck supple. No rigidity. No muscular tenderness.      Right lower leg: No edema.      Left lower leg: No edema.   Lymphadenopathy:      Head:      Right side of head: No occipital adenopathy.      Left side of head: No occipital adenopathy.      Cervical: No cervical adenopathy.      Right cervical: No superficial, deep or posterior cervical adenopathy.     Left cervical:  No superficial, deep or posterior cervical adenopathy.      Upper Body:      Right upper body: No supraclavicular adenopathy.      Left upper body: No supraclavicular adenopathy.      Lower Body: No right inguinal adenopathy. No left inguinal adenopathy.   Skin:     General: Skin is warm and dry.      Findings: No rash.      Comments: Diffuse scattered occasional closed comedones on back   Neurological:      Mental Status: He is alert and oriented to person, place, and time.      Cranial Nerves: No cranial nerve deficit, dysarthria or facial asymmetry.      Sensory: No sensory deficit.      Motor: No weakness, tremor, atrophy or abnormal muscle tone.      Gait: Gait normal.      Deep Tendon Reflexes: Reflexes are normal and symmetric.      Reflex Scores:       Patellar reflexes are 2+ on the right side and 2+ on the left side.       Achilles reflexes are 2+ on the right side and 2+ on the left side.  Psychiatric:         Attention and Perception: Attention normal.         Mood and Affect: Mood normal.         Speech: Speech normal.         Behavior: Behavior normal. Behavior is cooperative.         Thought Content: Thought content normal.         Assessment/ Plan  Diagnoses and all orders for this visit:    Right carotid bruit  -     Duplex Carotid Ultrasound CAR; Future    Encounter for general adult medical examination with abnormal findings    Low HDL (under 40)    Acne vulgaris    Lymphadenopathy, occipital        Return in about 1 year (around 4/26/2025) for Annual physical.      Discussion:  Homer PERSAUD Dashawn is a 28 y.o. male RTC in yearly CPE, review of medical issues:   1. Shotty LAD occipital region - remains improved on exam, mobile.  No B symptoms.  No change, per patient CBC OK.  Recall 4/2021 CT neck soft tissues with 'small lymph node posterior lateral to the right trapezius muscle at the C2 cervical level measures 9 x 5 x 12 mm.  Contralaterally at the same location is an 8 x 4 x 10 mm lymph node.   These are probably  benign reactive nodes and I think that they can be safely followed clinically'.  Monitor for now.   2. Cerumen, B -obscures TM on R, small only on L today.  Required in office lavage in the past.  Reviewed with patient today up to daily olive oil drops and soaking with shower water lavage at home to maintain cerumen clearance.  Patient agrees to take on this program at home.  3. Sudden sensorineural hearing loss L - > 4 years out, s/p ENT with steroid oral tx.  MAGALIS.   4. Acne, primarily closed comedones on back -able, declined treatment in past   5. R carotid bruit - persists, noted on exam, cannot get to resolve with position change. Asx'ic. US carotids to assess; reviewed with patient again today and he agrees to schedule.  Number to scheduling provided to patient..    6. Low HDL - Baseline 26; Improved in 2022 to 38 with increase in CV exercise, further improved to 41 in 2023 and again today. Likely some genetic element baseline. Counseled on need for increase in CV exercise.   7. HM - labs d/w pt; Flu - next season, counseled; Tdap/ Hep A/ Hep B/ Menactra -UTD; Gardasil x2 only completed;  COVID vaccine strongly advised; U G/C and HIV screen (-) 6/2019, condom use consistent; c/w exercise and TLC diet    RTC one year CPE, F labs prior

## 2024-05-06 ENCOUNTER — HOSPITAL ENCOUNTER (OUTPATIENT)
Dept: CARDIOLOGY | Facility: HOSPITAL | Age: 29
Discharge: HOME | End: 2024-05-06
Admitting: INTERNAL MEDICINE
Payer: COMMERCIAL

## 2024-05-06 DIAGNOSIS — R09.89 RIGHT CAROTID BRUIT: ICD-10-CM

## 2024-05-06 LAB
BH CV XLRA MEAS LEFT DIST CCA EDV: -31.8 CM/SEC
BH CV XLRA MEAS LEFT DIST CCA PSV: -155.9 CM/SEC
BH CV XLRA MEAS LEFT DIST ICA EDV: -37.8 CM/SEC
BH CV XLRA MEAS LEFT DIST ICA PSV: -91.1 CM/SEC
BH CV XLRA MEAS LEFT ICA/CCA RATIO: 0.75
BH CV XLRA MEAS LEFT MID ICA EDV: -35.7 CM/SEC
BH CV XLRA MEAS LEFT MID ICA PSV: -86.2 CM/SEC
BH CV XLRA MEAS LEFT PROX CCA EDV: 22 CM/SEC
BH CV XLRA MEAS LEFT PROX CCA PSV: 185.5 CM/SEC
BH CV XLRA MEAS LEFT PROX ECA EDV: -14.7 CM/SEC
BH CV XLRA MEAS LEFT PROX ECA PSV: -111.4 CM/SEC
BH CV XLRA MEAS LEFT PROX ICA EDV: -23.8 CM/SEC
BH CV XLRA MEAS LEFT PROX ICA PSV: -116.3 CM/SEC
BH CV XLRA MEAS LEFT PROX SCLA PSV: 213.4 CM/SEC
BH CV XLRA MEAS LEFT VERTEBRAL A EDV: -13.8 CM/SEC
BH CV XLRA MEAS LEFT VERTEBRAL A PSV: -56.1 CM/SEC
BH CV XLRA MEAS RIGHT DIST CCA EDV: -30 CM/SEC
BH CV XLRA MEAS RIGHT DIST CCA PSV: -140.4 CM/SEC
BH CV XLRA MEAS RIGHT DIST ICA EDV: -34.3 CM/SEC
BH CV XLRA MEAS RIGHT DIST ICA PSV: -98.1 CM/SEC
BH CV XLRA MEAS RIGHT ICA/CCA RATIO: 0.8
BH CV XLRA MEAS RIGHT MID ICA EDV: -37.8 CM/SEC
BH CV XLRA MEAS RIGHT MID ICA PSV: -112.1 CM/SEC
BH CV XLRA MEAS RIGHT PROX CCA EDV: 30 CM/SEC
BH CV XLRA MEAS RIGHT PROX CCA PSV: 186.9 CM/SEC
BH CV XLRA MEAS RIGHT PROX ECA EDV: -19.4 CM/SEC
BH CV XLRA MEAS RIGHT PROX ECA PSV: -125.9 CM/SEC
BH CV XLRA MEAS RIGHT PROX ICA EDV: -30.1 CM/SEC
BH CV XLRA MEAS RIGHT PROX ICA PSV: -101.6 CM/SEC
BH CV XLRA MEAS RIGHT PROX SCLA PSV: 174.5 CM/SEC
BH CV XLRA MEAS RIGHT VERTEBRAL A EDV: 18.2 CM/SEC
BH CV XLRA MEAS RIGHT VERTEBRAL A PSV: 69.3 CM/SEC
LEFT ARM BP: NORMAL MMHG
RIGHT ARM BP: NORMAL MMHG

## 2024-05-06 PROCEDURE — 93880 EXTRACRANIAL BILAT STUDY: CPT | Performed by: STUDENT IN AN ORGANIZED HEALTH CARE EDUCATION/TRAINING PROGRAM

## 2024-05-06 PROCEDURE — 93880 EXTRACRANIAL BILAT STUDY: CPT
